# Patient Record
Sex: FEMALE | Race: OTHER | NOT HISPANIC OR LATINO
[De-identification: names, ages, dates, MRNs, and addresses within clinical notes are randomized per-mention and may not be internally consistent; named-entity substitution may affect disease eponyms.]

---

## 2017-12-13 PROBLEM — L82.1 OTHER SEBORRHEIC KERATOSIS: Status: ACTIVE | Noted: 2017-12-13

## 2017-12-13 PROBLEM — I83.93 ASYMPTOMATIC VARICOSE VEINS OF BILATERAL LOWER EXTREMITIES: Status: ACTIVE | Noted: 2017-12-13

## 2017-12-13 PROBLEM — D69.2 OTHER NONTHROMBOCYTOPENIC PURPURA: Status: ACTIVE | Noted: 2017-12-13

## 2017-12-13 PROBLEM — L81.4 OTHER MELANIN HYPERPIGMENTATION: Status: ACTIVE | Noted: 2017-12-13

## 2017-12-13 PROBLEM — D22.71 MELANOCYTIC NEVI OF RIGHT LOWER LIMB, INCLUDING HIP: Status: ACTIVE | Noted: 2017-12-13

## 2017-12-13 PROBLEM — L85.3 XEROSIS CUTIS: Status: ACTIVE | Noted: 2017-12-13

## 2017-12-13 PROBLEM — D22.5 MELANOCYTIC NEVI OF TRUNK: Status: ACTIVE | Noted: 2017-12-13

## 2017-12-13 PROBLEM — D18.01 HEMANGIOMA OF SKIN AND SUBCUTANEOUS TISSUE: Status: ACTIVE | Noted: 2017-12-13

## 2018-06-01 ENCOUNTER — RECORD ABSTRACTING (OUTPATIENT)
Age: 73
End: 2018-06-01

## 2018-06-01 DIAGNOSIS — Z82.49 FAMILY HISTORY OF ISCHEMIC HEART DISEASE AND OTHER DISEASES OF THE CIRCULATORY SYSTEM: ICD-10-CM

## 2018-06-01 DIAGNOSIS — M70.72 OTHER BURSITIS OF HIP, LEFT HIP: ICD-10-CM

## 2018-06-01 DIAGNOSIS — I10 ESSENTIAL (PRIMARY) HYPERTENSION: ICD-10-CM

## 2018-06-01 DIAGNOSIS — Z87.891 PERSONAL HISTORY OF NICOTINE DEPENDENCE: ICD-10-CM

## 2018-06-01 DIAGNOSIS — M81.0 AGE-RELATED OSTEOPOROSIS W/OUT CURRENT PATHOLOGICAL FRACTURE: ICD-10-CM

## 2018-06-01 RX ORDER — SIMVASTATIN 20 MG/1
20 TABLET, FILM COATED ORAL
Refills: 0 | Status: ACTIVE | COMMUNITY

## 2018-06-01 RX ORDER — IRBESARTAN 300 MG/1
300 TABLET, FILM COATED ORAL DAILY
Refills: 0 | Status: ACTIVE | COMMUNITY

## 2018-06-01 RX ORDER — IBANDRONATE SODIUM 150 MG/1
150 TABLET, FILM COATED ORAL
Refills: 0 | Status: ACTIVE | COMMUNITY

## 2018-06-01 RX ORDER — ERGOCALCIFEROL (VITAMIN D2) 1250 MCG
50000 CAPSULE ORAL
Refills: 0 | Status: ACTIVE | COMMUNITY

## 2018-06-01 RX ORDER — HYDROCHLOROTHIAZIDE 12.5 MG/1
12.5 TABLET ORAL
Refills: 0 | Status: ACTIVE | COMMUNITY

## 2018-08-20 ENCOUNTER — APPOINTMENT (OUTPATIENT)
Dept: ORTHOPEDIC SURGERY | Facility: CLINIC | Age: 73
End: 2018-08-20
Payer: MEDICARE

## 2018-08-20 VITALS — BODY MASS INDEX: 33.32 KG/M2 | HEIGHT: 65 IN | WEIGHT: 200 LBS

## 2018-08-20 DIAGNOSIS — M17.10 UNILATERAL PRIMARY OSTEOARTHRITIS, UNSPECIFIED KNEE: ICD-10-CM

## 2018-08-20 DIAGNOSIS — M70.61 TROCHANTERIC BURSITIS, RIGHT HIP: ICD-10-CM

## 2018-08-20 DIAGNOSIS — M43.07 SPONDYLOLYSIS, LUMBOSACRAL REGION: ICD-10-CM

## 2018-08-20 PROCEDURE — 73562 X-RAY EXAM OF KNEE 3: CPT | Mod: 50

## 2018-08-20 PROCEDURE — 99214 OFFICE O/P EST MOD 30 MIN: CPT

## 2018-12-17 PROBLEM — L82.1 OTHER SEBORRHEIC KERATOSIS: Status: ACTIVE | Noted: 2018-12-17

## 2018-12-17 PROBLEM — D69.2 OTHER NONTHROMBOCYTOPENIC PURPURA: Status: ACTIVE | Noted: 2018-12-17

## 2018-12-17 PROBLEM — L85.3 XEROSIS CUTIS: Status: ACTIVE | Noted: 2018-12-17

## 2018-12-17 PROBLEM — D22.71 MELANOCYTIC NEVI OF RIGHT LOWER LIMB, INCLUDING HIP: Status: ACTIVE | Noted: 2018-12-17

## 2018-12-17 PROBLEM — D22.5 MELANOCYTIC NEVI OF TRUNK: Status: ACTIVE | Noted: 2018-12-17

## 2018-12-17 PROBLEM — L72.8 OTHER FOLLICULAR CYSTS OF THE SKIN AND SUBCUTANEOUS TISSUE: Status: ACTIVE | Noted: 2018-12-17

## 2018-12-17 PROBLEM — D18.01 HEMANGIOMA OF SKIN AND SUBCUTANEOUS TISSUE: Status: ACTIVE | Noted: 2018-12-17

## 2018-12-17 PROBLEM — I83.93 ASYMPTOMATIC VARICOSE VEINS OF BILATERAL LOWER EXTREMITIES: Status: ACTIVE | Noted: 2018-12-17

## 2018-12-17 PROBLEM — L81.4 OTHER MELANIN HYPERPIGMENTATION: Status: ACTIVE | Noted: 2018-12-17

## 2019-04-29 PROBLEM — L81.4 OTHER MELANIN HYPERPIGMENTATION: Status: ACTIVE | Noted: 2019-04-29

## 2019-04-29 PROBLEM — D18.01 HEMANGIOMA OF SKIN AND SUBCUTANEOUS TISSUE: Status: ACTIVE | Noted: 2019-04-29

## 2019-04-29 PROBLEM — D69.2 OTHER NONTHROMBOCYTOPENIC PURPURA: Status: ACTIVE | Noted: 2019-04-29

## 2019-06-21 ENCOUNTER — INPATIENT (INPATIENT)
Facility: HOSPITAL | Age: 74
LOS: 3 days | Discharge: NURSING, REHAB OR LONG-TERM CARE | DRG: 552 | End: 2019-06-25
Attending: STUDENT IN AN ORGANIZED HEALTH CARE EDUCATION/TRAINING PROGRAM | Admitting: STUDENT IN AN ORGANIZED HEALTH CARE EDUCATION/TRAINING PROGRAM
Payer: MEDICARE

## 2019-06-21 VITALS
OXYGEN SATURATION: 98 % | HEART RATE: 67 BPM | SYSTOLIC BLOOD PRESSURE: 137 MMHG | TEMPERATURE: 98 F | WEIGHT: 199.08 LBS | DIASTOLIC BLOOD PRESSURE: 82 MMHG | RESPIRATION RATE: 20 BRPM | HEIGHT: 66 IN

## 2019-06-21 PROCEDURE — 99285 EMERGENCY DEPT VISIT HI MDM: CPT | Mod: GC

## 2019-06-22 DIAGNOSIS — M81.0 AGE-RELATED OSTEOPOROSIS WITHOUT CURRENT PATHOLOGICAL FRACTURE: ICD-10-CM

## 2019-06-22 DIAGNOSIS — M54.9 DORSALGIA, UNSPECIFIED: ICD-10-CM

## 2019-06-22 DIAGNOSIS — I10 ESSENTIAL (PRIMARY) HYPERTENSION: ICD-10-CM

## 2019-06-22 DIAGNOSIS — Z96.659 PRESENCE OF UNSPECIFIED ARTIFICIAL KNEE JOINT: Chronic | ICD-10-CM

## 2019-06-22 DIAGNOSIS — Z29.9 ENCOUNTER FOR PROPHYLACTIC MEASURES, UNSPECIFIED: ICD-10-CM

## 2019-06-22 DIAGNOSIS — E87.8 OTHER DISORDERS OF ELECTROLYTE AND FLUID BALANCE, NOT ELSEWHERE CLASSIFIED: ICD-10-CM

## 2019-06-22 DIAGNOSIS — Z96.651 PRESENCE OF RIGHT ARTIFICIAL KNEE JOINT: Chronic | ICD-10-CM

## 2019-06-22 LAB
ALBUMIN SERPL ELPH-MCNC: 4 G/DL — SIGNIFICANT CHANGE UP (ref 3.3–5)
ALP SERPL-CCNC: 34 U/L — LOW (ref 40–120)
ALT FLD-CCNC: 12 U/L — SIGNIFICANT CHANGE UP (ref 10–45)
ANION GAP SERPL CALC-SCNC: 12 MMOL/L — SIGNIFICANT CHANGE UP (ref 5–17)
ANION GAP SERPL CALC-SCNC: 9 MMOL/L — SIGNIFICANT CHANGE UP (ref 5–17)
APPEARANCE UR: ABNORMAL
AST SERPL-CCNC: 10 U/L — SIGNIFICANT CHANGE UP (ref 10–40)
BASOPHILS # BLD AUTO: 0 K/UL — SIGNIFICANT CHANGE UP (ref 0–0.2)
BASOPHILS NFR BLD AUTO: 0.4 % — SIGNIFICANT CHANGE UP (ref 0–2)
BILIRUB SERPL-MCNC: 0.4 MG/DL — SIGNIFICANT CHANGE UP (ref 0.2–1.2)
BILIRUB UR-MCNC: NEGATIVE — SIGNIFICANT CHANGE UP
BUN SERPL-MCNC: 18 MG/DL — SIGNIFICANT CHANGE UP (ref 7–23)
BUN SERPL-MCNC: 22 MG/DL — SIGNIFICANT CHANGE UP (ref 7–23)
CALCIUM SERPL-MCNC: 9.4 MG/DL — SIGNIFICANT CHANGE UP (ref 8.4–10.5)
CALCIUM SERPL-MCNC: 9.6 MG/DL — SIGNIFICANT CHANGE UP (ref 8.4–10.5)
CHLORIDE SERPL-SCNC: 104 MMOL/L — SIGNIFICANT CHANGE UP (ref 96–108)
CHLORIDE SERPL-SCNC: 105 MMOL/L — SIGNIFICANT CHANGE UP (ref 96–108)
CO2 SERPL-SCNC: 24 MMOL/L — SIGNIFICANT CHANGE UP (ref 22–31)
CO2 SERPL-SCNC: 26 MMOL/L — SIGNIFICANT CHANGE UP (ref 22–31)
COLOR SPEC: YELLOW — SIGNIFICANT CHANGE UP
CREAT SERPL-MCNC: 0.56 MG/DL — SIGNIFICANT CHANGE UP (ref 0.5–1.3)
CREAT SERPL-MCNC: 0.6 MG/DL — SIGNIFICANT CHANGE UP (ref 0.5–1.3)
DIFF PNL FLD: ABNORMAL
EOSINOPHIL # BLD AUTO: 0.3 K/UL — SIGNIFICANT CHANGE UP (ref 0–0.5)
EOSINOPHIL NFR BLD AUTO: 4 % — SIGNIFICANT CHANGE UP (ref 0–6)
GLUCOSE SERPL-MCNC: 104 MG/DL — HIGH (ref 70–99)
GLUCOSE SERPL-MCNC: 85 MG/DL — SIGNIFICANT CHANGE UP (ref 70–99)
GLUCOSE UR QL: NEGATIVE — SIGNIFICANT CHANGE UP
HCT VFR BLD CALC: 37.9 % — SIGNIFICANT CHANGE UP (ref 34.5–45)
HGB BLD-MCNC: 12.9 G/DL — SIGNIFICANT CHANGE UP (ref 11.5–15.5)
KETONES UR-MCNC: NEGATIVE — SIGNIFICANT CHANGE UP
LEUKOCYTE ESTERASE UR-ACNC: ABNORMAL
LYMPHOCYTES # BLD AUTO: 1.4 K/UL — SIGNIFICANT CHANGE UP (ref 1–3.3)
LYMPHOCYTES # BLD AUTO: 19.5 % — SIGNIFICANT CHANGE UP (ref 13–44)
MAGNESIUM SERPL-MCNC: 1.9 MG/DL — SIGNIFICANT CHANGE UP (ref 1.6–2.6)
MCHC RBC-ENTMCNC: 31.8 PG — SIGNIFICANT CHANGE UP (ref 27–34)
MCHC RBC-ENTMCNC: 34 GM/DL — SIGNIFICANT CHANGE UP (ref 32–36)
MCV RBC AUTO: 93.5 FL — SIGNIFICANT CHANGE UP (ref 80–100)
MONOCYTES # BLD AUTO: 0.7 K/UL — SIGNIFICANT CHANGE UP (ref 0–0.9)
MONOCYTES NFR BLD AUTO: 9.7 % — SIGNIFICANT CHANGE UP (ref 2–14)
NEUTROPHILS # BLD AUTO: 4.9 K/UL — SIGNIFICANT CHANGE UP (ref 1.8–7.4)
NEUTROPHILS NFR BLD AUTO: 66.3 % — SIGNIFICANT CHANGE UP (ref 43–77)
NITRITE UR-MCNC: POSITIVE
PH UR: 6.5 — SIGNIFICANT CHANGE UP (ref 5–8)
PHOSPHATE SERPL-MCNC: 1.6 MG/DL — LOW (ref 2.5–4.5)
PLATELET # BLD AUTO: 244 K/UL — SIGNIFICANT CHANGE UP (ref 150–400)
POTASSIUM SERPL-MCNC: 3 MMOL/L — LOW (ref 3.5–5.3)
POTASSIUM SERPL-MCNC: 3.5 MMOL/L — SIGNIFICANT CHANGE UP (ref 3.5–5.3)
POTASSIUM SERPL-SCNC: 3 MMOL/L — LOW (ref 3.5–5.3)
POTASSIUM SERPL-SCNC: 3.5 MMOL/L — SIGNIFICANT CHANGE UP (ref 3.5–5.3)
PROT SERPL-MCNC: 6.5 G/DL — SIGNIFICANT CHANGE UP (ref 6–8.3)
PROT UR-MCNC: 100 — SIGNIFICANT CHANGE UP
RBC # BLD: 4.05 M/UL — SIGNIFICANT CHANGE UP (ref 3.8–5.2)
RBC # FLD: 12.1 % — SIGNIFICANT CHANGE UP (ref 10.3–14.5)
SODIUM SERPL-SCNC: 140 MMOL/L — SIGNIFICANT CHANGE UP (ref 135–145)
SODIUM SERPL-SCNC: 140 MMOL/L — SIGNIFICANT CHANGE UP (ref 135–145)
SP GR SPEC: 1.02 — SIGNIFICANT CHANGE UP (ref 1.01–1.02)
UROBILINOGEN FLD QL: NEGATIVE — SIGNIFICANT CHANGE UP
WBC # BLD: 7.4 K/UL — SIGNIFICANT CHANGE UP (ref 3.8–10.5)
WBC # FLD AUTO: 7.4 K/UL — SIGNIFICANT CHANGE UP (ref 3.8–10.5)

## 2019-06-22 PROCEDURE — 72158 MRI LUMBAR SPINE W/O & W/DYE: CPT | Mod: 26

## 2019-06-22 PROCEDURE — 72157 MRI CHEST SPINE W/O & W/DYE: CPT | Mod: 26

## 2019-06-22 PROCEDURE — 99223 1ST HOSP IP/OBS HIGH 75: CPT | Mod: AI,GC

## 2019-06-22 RX ORDER — ACETAMINOPHEN 500 MG
975 TABLET ORAL EVERY 6 HOURS
Refills: 0 | Status: DISCONTINUED | OUTPATIENT
Start: 2019-06-22 | End: 2019-06-25

## 2019-06-22 RX ORDER — PANTOPRAZOLE SODIUM 20 MG/1
40 TABLET, DELAYED RELEASE ORAL
Refills: 0 | Status: DISCONTINUED | OUTPATIENT
Start: 2019-06-22 | End: 2019-06-25

## 2019-06-22 RX ORDER — AMLODIPINE BESYLATE 2.5 MG/1
1 TABLET ORAL
Qty: 0 | Refills: 0 | DISCHARGE

## 2019-06-22 RX ORDER — ACETAMINOPHEN 500 MG
1000 TABLET ORAL ONCE
Refills: 0 | Status: COMPLETED | OUTPATIENT
Start: 2019-06-22 | End: 2019-06-22

## 2019-06-22 RX ORDER — TRAZODONE HCL 50 MG
1 TABLET ORAL
Qty: 0 | Refills: 0 | DISCHARGE

## 2019-06-22 RX ORDER — OXYCODONE HYDROCHLORIDE 5 MG/1
5 TABLET ORAL EVERY 4 HOURS
Refills: 0 | Status: DISCONTINUED | OUTPATIENT
Start: 2019-06-22 | End: 2019-06-25

## 2019-06-22 RX ORDER — SIMVASTATIN 20 MG/1
20 TABLET, FILM COATED ORAL AT BEDTIME
Refills: 0 | Status: DISCONTINUED | OUTPATIENT
Start: 2019-06-22 | End: 2019-06-25

## 2019-06-22 RX ORDER — OXYCODONE HYDROCHLORIDE 5 MG/1
5 TABLET ORAL ONCE
Refills: 0 | Status: DISCONTINUED | OUTPATIENT
Start: 2019-06-22 | End: 2019-06-22

## 2019-06-22 RX ORDER — ASPIRIN/CALCIUM CARB/MAGNESIUM 324 MG
1 TABLET ORAL
Qty: 0 | Refills: 0 | DISCHARGE

## 2019-06-22 RX ORDER — LIDOCAINE 4 G/100G
1 CREAM TOPICAL DAILY
Refills: 0 | Status: DISCONTINUED | OUTPATIENT
Start: 2019-06-22 | End: 2019-06-25

## 2019-06-22 RX ORDER — POTASSIUM CHLORIDE 20 MEQ
40 PACKET (EA) ORAL ONCE
Refills: 0 | Status: COMPLETED | OUTPATIENT
Start: 2019-06-22 | End: 2019-06-22

## 2019-06-22 RX ORDER — OXYBUTYNIN CHLORIDE 5 MG
5 TABLET ORAL THREE TIMES A DAY
Refills: 0 | Status: DISCONTINUED | OUTPATIENT
Start: 2019-06-22 | End: 2019-06-23

## 2019-06-22 RX ORDER — CYCLOBENZAPRINE HYDROCHLORIDE 10 MG/1
5 TABLET, FILM COATED ORAL ONCE
Refills: 0 | Status: COMPLETED | OUTPATIENT
Start: 2019-06-22 | End: 2019-06-22

## 2019-06-22 RX ORDER — CYCLOBENZAPRINE HYDROCHLORIDE 10 MG/1
10 TABLET, FILM COATED ORAL THREE TIMES A DAY
Refills: 0 | Status: DISCONTINUED | OUTPATIENT
Start: 2019-06-22 | End: 2019-06-25

## 2019-06-22 RX ORDER — CEFTRIAXONE 500 MG/1
1000 INJECTION, POWDER, FOR SOLUTION INTRAMUSCULAR; INTRAVENOUS EVERY 24 HOURS
Refills: 0 | Status: DISCONTINUED | OUTPATIENT
Start: 2019-06-22 | End: 2019-06-23

## 2019-06-22 RX ORDER — DIAZEPAM 5 MG
5 TABLET ORAL ONCE
Refills: 0 | Status: DISCONTINUED | OUTPATIENT
Start: 2019-06-22 | End: 2019-06-22

## 2019-06-22 RX ORDER — LOSARTAN POTASSIUM 100 MG/1
100 TABLET, FILM COATED ORAL DAILY
Refills: 0 | Status: DISCONTINUED | OUTPATIENT
Start: 2019-06-22 | End: 2019-06-25

## 2019-06-22 RX ORDER — SIMVASTATIN 20 MG/1
1 TABLET, FILM COATED ORAL
Qty: 0 | Refills: 0 | DISCHARGE

## 2019-06-22 RX ORDER — OXYBUTYNIN CHLORIDE 5 MG
1 TABLET ORAL
Qty: 0 | Refills: 0 | DISCHARGE

## 2019-06-22 RX ORDER — OMEPRAZOLE 10 MG/1
1 CAPSULE, DELAYED RELEASE ORAL
Qty: 0 | Refills: 0 | DISCHARGE

## 2019-06-22 RX ORDER — ACETAMINOPHEN 500 MG
975 TABLET ORAL ONCE
Refills: 0 | Status: COMPLETED | OUTPATIENT
Start: 2019-06-22 | End: 2019-06-22

## 2019-06-22 RX ORDER — AMLODIPINE BESYLATE 2.5 MG/1
5 TABLET ORAL DAILY
Refills: 0 | Status: DISCONTINUED | OUTPATIENT
Start: 2019-06-22 | End: 2019-06-25

## 2019-06-22 RX ORDER — ENOXAPARIN SODIUM 100 MG/ML
40 INJECTION SUBCUTANEOUS EVERY 24 HOURS
Refills: 0 | Status: DISCONTINUED | OUTPATIENT
Start: 2019-06-22 | End: 2019-06-25

## 2019-06-22 RX ORDER — TRAMADOL HYDROCHLORIDE 50 MG/1
50 TABLET ORAL THREE TIMES A DAY
Refills: 0 | Status: DISCONTINUED | OUTPATIENT
Start: 2019-06-22 | End: 2019-06-25

## 2019-06-22 RX ADMIN — Medication 40 MILLIEQUIVALENT(S): at 09:40

## 2019-06-22 RX ADMIN — Medication 1000 MILLIGRAM(S): at 10:25

## 2019-06-22 RX ADMIN — Medication 975 MILLIGRAM(S): at 03:08

## 2019-06-22 RX ADMIN — Medication 975 MILLIGRAM(S): at 14:21

## 2019-06-22 RX ADMIN — CYCLOBENZAPRINE HYDROCHLORIDE 10 MILLIGRAM(S): 10 TABLET, FILM COATED ORAL at 14:21

## 2019-06-22 RX ADMIN — Medication 975 MILLIGRAM(S): at 15:15

## 2019-06-22 RX ADMIN — OXYCODONE HYDROCHLORIDE 5 MILLIGRAM(S): 5 TABLET ORAL at 03:08

## 2019-06-22 RX ADMIN — ENOXAPARIN SODIUM 40 MILLIGRAM(S): 100 INJECTION SUBCUTANEOUS at 18:43

## 2019-06-22 RX ADMIN — LIDOCAINE 1 PATCH: 4 CREAM TOPICAL at 18:43

## 2019-06-22 RX ADMIN — OXYCODONE HYDROCHLORIDE 5 MILLIGRAM(S): 5 TABLET ORAL at 04:04

## 2019-06-22 RX ADMIN — OXYCODONE HYDROCHLORIDE 5 MILLIGRAM(S): 5 TABLET ORAL at 22:31

## 2019-06-22 RX ADMIN — AMLODIPINE BESYLATE 5 MILLIGRAM(S): 2.5 TABLET ORAL at 18:43

## 2019-06-22 RX ADMIN — Medication 975 MILLIGRAM(S): at 04:04

## 2019-06-22 RX ADMIN — SIMVASTATIN 20 MILLIGRAM(S): 20 TABLET, FILM COATED ORAL at 22:31

## 2019-06-22 RX ADMIN — Medication 5 MILLIGRAM(S): at 05:10

## 2019-06-22 RX ADMIN — Medication 400 MILLIGRAM(S): at 09:41

## 2019-06-22 RX ADMIN — CEFTRIAXONE 100 MILLIGRAM(S): 500 INJECTION, POWDER, FOR SOLUTION INTRAMUSCULAR; INTRAVENOUS at 23:20

## 2019-06-22 RX ADMIN — CYCLOBENZAPRINE HYDROCHLORIDE 5 MILLIGRAM(S): 10 TABLET, FILM COATED ORAL at 03:08

## 2019-06-22 RX ADMIN — Medication 5 MILLIGRAM(S): at 22:31

## 2019-06-22 RX ADMIN — CYCLOBENZAPRINE HYDROCHLORIDE 10 MILLIGRAM(S): 10 TABLET, FILM COATED ORAL at 20:50

## 2019-06-22 NOTE — ED PROVIDER NOTE - ATTENDING CONTRIBUTION TO CARE
73 yof pmhx none signif presents w lower back pain w rads into buttock/ left leg and intermittently right. states has chronic back pain, however significantly worse after a prolonged drive from [place] few days ago w sitting in car x hours. staes no recent trauma or falls, no f/c. no recent invasive procedures. pain severe. states cannot walk due to pain    ROS:   constitutional - no fever, no chills  eyes - no visual changes, no redness  eent - no sore throat, no nasal congestion  cvs - no chest pain, no leg swelling  resp - no shortness of breath, no cough  gi - no abdominal pain, no vomiting, no diarrhea  gu - no dysuria, no hematuria  msk - + acute back pain, no joint swelling  skin - no rashes, no jaundice  neuro - no headache, no focal weakness  psych - no acute mental health issue     Physical Exam:   constitutional - well appearing, awake and alert, oriented x3, mild distress due to pain   head - no external evidence of trauma  eent - no conjunctival injection or icterus, mucous membranes moist   cvs - rrr, no murmurs, no peripheral edema  resp - breath sounds clear and equal bilat, normal respiratory effort  gi - abdomen soft and nontender, no rigidity, guarding or rebound, bowel sounds present  msk - moving all extremities spontaneously, gait not tested due to pain. midline lumbar tenderness and left buttock tenderness. no rashes. SLR + at 30 deg. no saddle anesthesia. strength 5/5 in all ext.   neuro - alert and oriented x3, no focal deficits, CNs 2-12 grossly intact  skin- no jaundice, warm and dry  psych - mood and affect wnl, no apparent risk to self or others     pt urinated on self in ED as states too much pain to walk to the bathroom, but denies incontinence. most likely arthritis vs herniated disk;  in ED yelling, demanding pt be admitted. pt unable to walk due to pain. may require mri, though no acute indications for acute spinal cord pathology at this time. CHICHO Tello MD

## 2019-06-22 NOTE — ED PROVIDER NOTE - CLINICAL SUMMARY MEDICAL DECISION MAKING FREE TEXT BOX
patient with back pain radiating down left leg. Likely sciatica and msk pain. Will treat symptomatically

## 2019-06-22 NOTE — H&P ADULT - HISTORY OF PRESENT ILLNESS
72 yo F with history of HTN, PE, b/l knee replacement, OA, osteoporosis presenting with back pain. Pt has been driving up from [place] over the past 4 days. Yesterday, while in [place], when she went to get up from her bed, she was unable to put any weight on her L leg and fell on the ground. She fell into a sitting position, had no head trauma but was unable to get up and EMS was called. She decided against going to the ED and EMS helped her into her car where her  drove her up to [place] to come to the hospital here. She was sitting in her car for 8 hours and on presentation to our ER, she began to have 10/10 pain on movement of her L leg. She endorses that the pain starts in her lower back and shoots down her leg with any movement. She is also experiencing muscle spasms in her lower back that cause pain as well. She is also endorsing numbness to the L lateral side of her LLE as well as the back of her LLE all the way down to her foot. She denies numbness to her RLE but endorses some mild pain in that side as well. Pt has a h/o osteoporosis and was told that she has a slipped disc but 72 yo F with history of HTN, PE, b/l knee replacement, OA, osteoporosis presenting with back pain. Pt has been driving up from [place] over the past 4 days. Yesterday, while in [place], when she went to get up from her bed, she was unable to put any weight on her L leg and fell on the ground. She fell into a sitting position, had no head trauma but was unable to get up and EMS was called. She decided against going to the ED and EMS helped her into her car where her  drove her up to [place] to come to the hospital here. She was sitting in her car for 8 hours and on presentation to our ER, she began to have 10/10 pain on movement of her L leg. She endorses that the pain starts in her lower back and shoots down her leg with any movement. She is also experiencing muscle spasms in her lower back that cause pain as well. She is also endorsing numbness to the L lateral side of her LLE as well as the back of her LLE all the way down to her foot. She denies numbness to her RLE but endorses some mild pain in that side as well. Pt has a h/o osteoporosis and was told that she has a slipped disc but has never had symptoms such as this before. Pt also endorsing that the day before yesterday, she had diarrhea all day, about 10 BMs that have now since completely resolved. Denies any incontinence, saddle anesthesia.    ED:  VS - Afebrile, HR 67, /64, RR 18, SpO2 100% on RA  Given acetaminophen 1g x2, flexeril 5mg, valium 5mg, oxycodone 5mg, potassium chloride 40mEq

## 2019-06-22 NOTE — H&P ADULT - NSHPREVIEWOFSYSTEMS_GEN_ALL_CORE
General: Denies dizziness, fatigue  Eyes: Denies blurry vision  ENMT: Denies rhinorrhea  Respiratory: Denies cough, SOB  Cardiovascular: Denies palpitations, CP  Gastrointestinal: Denies abd pain, N/V/D/C, hematochezia, melena  : Denies dysuria, increased freq  Musculoskeletal: Denies edema, joint pain  Endocrine: Denies increased thirst, increased frequency  Allergic/Immunologic: Denies rashes or hives  Neuro: +weakness, numbness

## 2019-06-22 NOTE — H&P ADULT - PROBLEM SELECTOR PLAN 1
Pt with new onset back pain in setting of muscle spasms, osteoporosis and potential slipped disc.  - C/w antispasmodics flexeril  - Tylenol, tramadol and oxycodone for pain  - Lovenox for DVT ppx  - PT eval  - MR lumbar and thoracic spine to r/o cord compression

## 2019-06-22 NOTE — ED ADULT NURSE REASSESSMENT NOTE - NS ED NURSE REASSESS COMMENT FT1
Pt complaining of pain, NP notified and to order pain medication, as per NP it's ok for Pt to eat and RN brought tray to Pt. Pt repositioned for comfort and brought pillow.

## 2019-06-22 NOTE — CHART NOTE - NSCHARTNOTEFT_GEN_A_CORE
d/w w/ radiology, pre-guerrero read of MRI, no signs of cord compression    Silvia Copeland  MAR  [id number]

## 2019-06-22 NOTE — ED PROVIDER NOTE - MUSCULOSKELETAL MINIMAL EXAM
left paraspinal lumbar tenderness, postive left straight leg raise, no midline tenderness, no bony tenderness, no ecchymosis

## 2019-06-22 NOTE — ED ADULT NURSE NOTE - OBJECTIVE STATEMENT
pt is here with low back pain and bilateral leg pain with numbness on bilateral thighs, left more than right; pt states she is unable to walk; request lift from  to stretcher upon arrival from waiting room; pt states she and her  recently drove up from [place] 2 days ago; pt denies any traum, falls, or mvc; pt is fully alert and oriented x 4; pt's  is at bedside; pt reports diarrhea yesterday x 1.

## 2019-06-22 NOTE — H&P ADULT - ASSESSMENT
72 yo F with history of HTN, PE, b/l knee replacement, OA, osteoporosis presenting with back pain with radiation down her leg and associated numbness.

## 2019-06-22 NOTE — H&P ADULT - NSHPLABSRESULTS_GEN_ALL_CORE
CBC Full  -  ( 22 Jun 2019 04:52 )  WBC Count : 7.4 K/uL  RBC Count : 4.05 M/uL  Hemoglobin : 12.9 g/dL  Hematocrit : 37.9 %  Platelet Count - Automated : 244 K/uL  Mean Cell Volume : 93.5 fl  Mean Cell Hemoglobin : 31.8 pg  Mean Cell Hemoglobin Concentration : 34.0 gm/dL  Auto Neutrophil # : 4.9 K/uL  Auto Lymphocyte # : 1.4 K/uL  Auto Monocyte # : 0.7 K/uL  Auto Eosinophil # : 0.3 K/uL  Auto Basophil # : 0.0 K/uL  Auto Neutrophil % : 66.3 %  Auto Lymphocyte % : 19.5 %  Auto Monocyte % : 9.7 %  Auto Eosinophil % : 4.0 %  Auto Basophil % : 0.4 %    06-22    140  |  105  |  18  ----------------------------<  85  3.5   |  26  |  0.56    Ca    9.4      22 Jun 2019 13:19  Phos  1.6     06-22  Mg     1.9     06-22    TPro  6.5  /  Alb  4.0  /  TBili  0.4  /  DBili  x   /  AST  10  /  ALT  12  /  AlkPhos  34<L>  06-22    EKG: Normal sinus rhythm, no evidence of ischemia

## 2019-06-22 NOTE — H&P ADULT - NSHPPHYSICALEXAM_GEN_ALL_CORE
Vital Signs Last 24 Hrs  T(C): 36.5 (22 Jun 2019 09:53), Max: 36.8 (22 Jun 2019 03:20)  T(F): 97.7 (22 Jun 2019 09:53), Max: 98.3 (22 Jun 2019 03:20)  HR: 66 (22 Jun 2019 09:53) (66 - 67)  BP: 139/78 (22 Jun 2019 09:53) (121/64 - 139/78)  BP(mean): --  RR: 18 (22 Jun 2019 09:53) (18 - 20)  SpO2: 100% (22 Jun 2019 09:53) (98% - 100%)    Constitutional: Elderly woman seen lying in bed in NAD  Eyes: PERRL, sclera clear  ENMT: MMM, clear oropharynx  Neck: Supple, no cervical LAD  Respiratory: CTAB, no rales, rhonchi or wheezes  Cardiovascular: RRR, no m/g/r  Gastrointestinal: +BS, soft, NT/ND  Neurological: AAOx3  Psychiatric: Appropriate affect and mood  Extremities: No LE edema  MSK: Point tenderness over lumbar spinal processes, ttp over L paraspinal muscles of lumbar region; mild numbness of lateral and posterior LLE; 1/5 strength of LLE, 5/5 strength of RLE; negative leg raise test

## 2019-06-23 DIAGNOSIS — I26.99 OTHER PULMONARY EMBOLISM WITHOUT ACUTE COR PULMONALE: ICD-10-CM

## 2019-06-23 PROCEDURE — 99221 1ST HOSP IP/OBS SF/LOW 40: CPT

## 2019-06-23 PROCEDURE — 99233 SBSQ HOSP IP/OBS HIGH 50: CPT | Mod: GC

## 2019-06-23 RX ADMIN — CYCLOBENZAPRINE HYDROCHLORIDE 10 MILLIGRAM(S): 10 TABLET, FILM COATED ORAL at 13:00

## 2019-06-23 RX ADMIN — OXYCODONE HYDROCHLORIDE 5 MILLIGRAM(S): 5 TABLET ORAL at 19:42

## 2019-06-23 RX ADMIN — OXYCODONE HYDROCHLORIDE 5 MILLIGRAM(S): 5 TABLET ORAL at 05:54

## 2019-06-23 RX ADMIN — Medication 975 MILLIGRAM(S): at 13:29

## 2019-06-23 RX ADMIN — LIDOCAINE 1 PATCH: 4 CREAM TOPICAL at 13:04

## 2019-06-23 RX ADMIN — Medication 975 MILLIGRAM(S): at 12:59

## 2019-06-23 RX ADMIN — CYCLOBENZAPRINE HYDROCHLORIDE 10 MILLIGRAM(S): 10 TABLET, FILM COATED ORAL at 07:01

## 2019-06-23 RX ADMIN — AMLODIPINE BESYLATE 5 MILLIGRAM(S): 2.5 TABLET ORAL at 05:54

## 2019-06-23 RX ADMIN — Medication 24 MILLIGRAM(S): at 18:43

## 2019-06-23 RX ADMIN — ENOXAPARIN SODIUM 40 MILLIGRAM(S): 100 INJECTION SUBCUTANEOUS at 18:45

## 2019-06-23 RX ADMIN — LIDOCAINE 1 PATCH: 4 CREAM TOPICAL at 19:36

## 2019-06-23 RX ADMIN — Medication 5 MILLIGRAM(S): at 05:55

## 2019-06-23 RX ADMIN — LOSARTAN POTASSIUM 100 MILLIGRAM(S): 100 TABLET, FILM COATED ORAL at 05:54

## 2019-06-23 RX ADMIN — PANTOPRAZOLE SODIUM 40 MILLIGRAM(S): 20 TABLET, DELAYED RELEASE ORAL at 07:01

## 2019-06-23 RX ADMIN — OXYCODONE HYDROCHLORIDE 5 MILLIGRAM(S): 5 TABLET ORAL at 20:15

## 2019-06-23 RX ADMIN — SIMVASTATIN 20 MILLIGRAM(S): 20 TABLET, FILM COATED ORAL at 22:19

## 2019-06-23 NOTE — PROGRESS NOTE ADULT - PROBLEM SELECTOR PLAN 1
Pt with new onset back pain in setting of muscle spasms, osteoporosis and potential slipped disc.  - C/w antispasmodics flexeril  - Tylenol, tramadol and oxycodone for pain  - Lovenox for DVT ppx  - PT eval  - MR lumbar and thoracic spine shows multilevel degenerative changes from L1-S1, NSX consulted-> no surgical intervention at this time  - Will start medrol dose howie Pt with new onset back pain in setting of muscle spasms, osteoporosis and potential slipped disc.  - C/w antispasmodics flexeril  - Tylenol, tramadol, lidocaine, and oxycodone for pain  - Lovenox for DVT ppx  - PT eval  - MR lumbar and thoracic spine shows multilevel degenerative changes from L1-S1, NSX consulted-> no surgical intervention at this time  - Will start medrol dose howie

## 2019-06-23 NOTE — CONSULT NOTE ADULT - ASSESSMENT
73 yr old F h/o HTN, PE, b/l knee replacement, OA, osteoporosis admitted for back pain. She endorses that the pain starts in her lower back and shoots down her leg with any movement. She is also endorsing numbness to the L lateral side of her LLE. MRI showed grade 1 anterolisthesis seen involving L5 on S1 and multilevel degenerative changes from L1-S1.     Plan:  - No acute neurosurgical intervention at this time  - Continue management per primary team   - Pain control  - Medrol dose pack  - Physical therapy   - May follow up with Dr. Beasley upon discharge 73 yr old F h/o HTN, PE, b/l knee replacement, OA, osteoporosis on ASA admitted for back pain. She endorses that the pain starts in her lower back and shoots down her leg with any movement. She is also endorsing numbness to the L lateral side of her LLE. MRI showed grade 1 anterolisthesis seen involving L5 on S1 and multilevel degenerative changes from L1-S1.     Plan:  - No acute neurosurgical intervention at this time  - Continue management per primary team   - Hold ASA  - Pain control, gabapentin  - Medrol dose pack  - Physical therapy   - Will continue to follow-up, if no improvement may entertain surgical intervention

## 2019-06-23 NOTE — PROGRESS NOTE ADULT - PROBLEM SELECTOR PLAN 3
- C/w home meds amlodipine 5mg daily  - On losartan 100mg BID. Will give daily for now - C/w home meds amlodipine 5mg daily  - On losartan 100mg BID. Will give daily for now  - Monitor BP

## 2019-06-23 NOTE — CONSULT NOTE ADULT - ATTENDING COMMENTS
I reviewed the resident's note and agree. The patient is a 73-year-old female who presents with low back pain associated with left lower extremity pain and numbness, resulting in difficulty walking, found to have L3-S1 spinal stenosis and an L5-S1 Grade 1 spondylolisthesis. No acute neurosurgical intervention is indicated at this time. Consider a trial of conservative management with steroids, pain control, and physical therapy for now. I saw and evaluated the patient. I reviewed the resident's note and agree. The patient is a 73-year-old female who presents with low back pain associated with left greater than right lower extremity pain and numbness, resulting in difficulty walking, found to have L3-S1 spinal stenosis and an L5-S1 Grade 1 spondylolisthesis. No acute neurosurgical intervention is indicated at this time. Consider a trial of conservative management with steroids, pain control, and physical therapy for now.

## 2019-06-23 NOTE — PROGRESS NOTE ADULT - PROBLEM SELECTOR PLAN 4
H/o osteoporosis on prolia per patient.  - Monitor H/o osteoporosis on prolia per patient.  - outpt f/u H/o osteoporosis on prolia per patient.  - outpt f/u  -Will check Vitamin D level in am.

## 2019-06-23 NOTE — PROGRESS NOTE ADULT - PROBLEM SELECTOR PLAN 5
DVT ppx: lovenox subq  Diet: DASH - PT with hx of PE ~ 15 years ago, states it was incidentally found on CT scan. Endorses completing a 6 month course of coumadin  - Pt HDS, satting well on RA  - Outpt F/u  - DVT PPX with LMWH

## 2019-06-23 NOTE — PROGRESS NOTE ADULT - SUBJECTIVE AND OBJECTIVE BOX
Patient is a 73y old  Female who presents with a chief complaint of Back pain (2019 13:42)      SUBJECTIVE / OVERNIGHT EVENTS:  No acute events overnight. Patient seen and examined in AM. Patient denied fevers, CP, SOB, lower extremity pain.     MEDICATIONS  (STANDING):  amLODIPine   Tablet 5 milliGRAM(s) Oral daily  enoxaparin Injectable 40 milliGRAM(s) SubCutaneous every 24 hours  lidocaine   Patch 1 Patch Transdermal daily  losartan 100 milliGRAM(s) Oral daily  pantoprazole    Tablet 40 milliGRAM(s) Oral before breakfast  simvastatin 20 milliGRAM(s) Oral at bedtime    MEDICATIONS  (PRN):  acetaminophen   Tablet .. 975 milliGRAM(s) Oral every 6 hours PRN Mild Pain (1 - 3), Moderate Pain (4 - 6), Severe Pain (7 - 10)  cyclobenzaprine 10 milliGRAM(s) Oral three times a day PRN Muscle Spasm  oxyCODONE    IR 5 milliGRAM(s) Oral every 4 hours PRN Severe Pain (7 - 10)  traMADol 50 milliGRAM(s) Oral three times a day PRN Moderate Pain (4 - 6)        CAPILLARY BLOOD GLUCOSE        I&O's Summary    2019 07:01  -  2019 15:59  --------------------------------------------------------  IN: 360 mL / OUT: 400 mL / NET: -40 mL        PHYSICAL EXAM:  GENERAL: NAD, Obese  EYES: EOMI, PERRLA, conjunctiva and sclera clear  NECK: Supple, No JVD  CHEST/LUNG: Clear to auscultation bilaterally; No wheezes  HEART: Regular rate and rhythm; No murmurs, rubs, or gallops  ABDOMEN: Soft, Nontender, Nondistended; Bowel sounds present  EXTREMITIES:  No edema  PSYCH: AAOx3  NEUROLOGY: 3/5 in LLE ( limited by pain), 4/5 in RLE, 5/5 in upper ext's.   SKIN: No rashes or lesions    LABS:                        12.9   7.4   )-----------( 244      ( 2019 04:52 )             37.9     06-    140  |  105  |  18  ----------------------------<  85  3.5   |  26  |  0.56    Ca    9.4      2019 13:19  Phos  1.6       Mg     1.9         TPro  6.5  /  Alb  4.0  /  TBili  0.4  /  DBili  x   /  AST  10  /  ALT  12  /  AlkPhos  34<L>            Urinalysis Basic - ( 2019 14:43 )    Color: Yellow / Appearance: Slightly Turbid / S.016 / pH: x  Gluc: x / Ketone: Negative  / Bili: Negative / Urobili: Negative   Blood: x / Protein: 100 / Nitrite: Positive   Leuk Esterase: Large / RBC: 12 /hpf /  /HPF   Sq Epi: x / Non Sq Epi: 1 /hpf / Bacteria: Many            RADIOLOGY & ADDITIONAL TESTS:      Consultant(s) Notes Reviewed:  NSX    Care Discussed with Consultants/Other Providers: NSX Patient is a 73y old  Female who presents with a chief complaint of Back pain (2019 13:42)      SUBJECTIVE / OVERNIGHT EVENTS:  No acute events overnight. Patient seen and examined in AM. Patient denied fevers, CP, SOB, lower extremity pain.     MEDICATIONS  (STANDING):  amLODIPine   Tablet 5 milliGRAM(s) Oral daily  enoxaparin Injectable 40 milliGRAM(s) SubCutaneous every 24 hours  lidocaine   Patch 1 Patch Transdermal daily  losartan 100 milliGRAM(s) Oral daily  pantoprazole    Tablet 40 milliGRAM(s) Oral before breakfast  simvastatin 20 milliGRAM(s) Oral at bedtime    MEDICATIONS  (PRN):  acetaminophen   Tablet .. 975 milliGRAM(s) Oral every 6 hours PRN Mild Pain (1 - 3), Moderate Pain (4 - 6), Severe Pain (7 - 10)  cyclobenzaprine 10 milliGRAM(s) Oral three times a day PRN Muscle Spasm  oxyCODONE    IR 5 milliGRAM(s) Oral every 4 hours PRN Severe Pain (7 - 10)  traMADol 50 milliGRAM(s) Oral three times a day PRN Moderate Pain (4 - 6)        CAPILLARY BLOOD GLUCOSE        I&O's Summary    2019 07:01  -  2019 15:59  --------------------------------------------------------  IN: 360 mL / OUT: 400 mL / NET: -40 mL        PHYSICAL EXAM:  GENERAL: NAD, Obese  EYES: EOMI, PERRLA, conjunctiva and sclera clear  NECK: Supple, No JVD  CHEST/LUNG: Clear to auscultation bilaterally; No wheezes  HEART: Regular rate and rhythm; No murmurs, rubs, or gallops  ABDOMEN: Soft, Nontender, Nondistended; Bowel sounds present  EXTREMITIES:  No edema  PSYCH: AAOx3  NEUROLOGY: 3/5 in LLE ( limited by pain), 4/5 in RLE, 5/5 in upper ext's.   SKIN: No rashes or lesions    LABS:                        12.9   7.4   )-----------( 244      ( 2019 04:52 )             37.9     06-    140  |  105  |  18  ----------------------------<  85  3.5   |  26  |  0.56    Ca    9.4      2019 13:19  Phos  1.6       Mg     1.9         TPro  6.5  /  Alb  4.0  /  TBili  0.4  /  DBili  x   /  AST  10  /  ALT  12  /  AlkPhos  34<L>            Urinalysis Basic - ( 2019 14:43 )    Color: Yellow / Appearance: Slightly Turbid / S.016 / pH: x  Gluc: x / Ketone: Negative  / Bili: Negative / Urobili: Negative   Blood: x / Protein: 100 / Nitrite: Positive   Leuk Esterase: Large / RBC: 12 /hpf /  /HPF   Sq Epi: x / Non Sq Epi: 1 /hpf / Bacteria: Many      < from: MR Lumbar Spine w/wo IV Cont (19 @ 19:43) >  No abnormal masses or collections seen.    Impression: Degenerative changes as described above.    < end of copied text >        RADIOLOGY & ADDITIONAL TESTS:      Consultant(s) Notes Reviewed:  NSX    Care Discussed with Consultants/Other Providers: NSX

## 2019-06-23 NOTE — CONSULT NOTE ADULT - SUBJECTIVE AND OBJECTIVE BOX
p ([id number])     HPI: 73 yr old F h/o HTN, PE, b/l knee replacement, OA, osteoporosis admitted for back pain. Pt has been driving up from [place] over the past 4 days. Yesterday, while in [place], when she went to get up from her bed, she was unable to put any weight on her L leg and fell on the ground. She fell into a sitting position, had no head trauma but was unable to get up and EMS was called. She decided against going to the ED and EMS helped her into her car where her  drove her up to [place] to come to the hospital here. She endorses that the pain starts in her lower back and shoots down her leg with any movement. She is also endorsing numbness to the L lateral side of her LLE. Denies any incontinence, saddle anesthesia.    ED:  VS - Afebrile, HR 67, /64, RR 18, SpO2 100% on RA  Given acetaminophen 1g x2, flexeril 5mg, valium 5mg, oxycodone 5mg, potassium chloride 40mEq (2019 12:39)    PAST MEDICAL HISTORY   Osteoporosis  Osteoarthritis  Hypertension  No pertinent past medical history    PAST SURGICAL HISTORY   H/O total knee replacement  H/O total knee replacement, right    Aspir 81 (Hives; Other)  penicillin (Unknown)      MEDICATIONS:  Antibiotics:    Neuro:  acetaminophen   Tablet .. 975 milliGRAM(s) Oral every 6 hours PRN  cyclobenzaprine 10 milliGRAM(s) Oral three times a day PRN  oxyCODONE    IR 5 milliGRAM(s) Oral every 4 hours PRN  traMADol 50 milliGRAM(s) Oral three times a day PRN    Anticoagulation:  enoxaparin Injectable 40 milliGRAM(s) SubCutaneous every 24 hours    Other:  amLODIPine   Tablet 5 milliGRAM(s) Oral daily  losartan 100 milliGRAM(s) Oral daily  pantoprazole    Tablet 40 milliGRAM(s) Oral before breakfast  simvastatin 20 milliGRAM(s) Oral at bedtime      SOCIAL HISTORY:   Occupation:   Marital Status:     FAMILY HISTORY:  FH: CAD (coronary artery disease)      PHYSICAL EXAMINATION:   T(C): 37.2 (19 @ 04:45), Max: 37.2 (19 @ 04:45)  HR: 82 (19 @ 04:45) (76 - 94)  BP: 133/85 (19 @ 04:45) (115/74 - 133/85)  RR: 18 (19 @ 04:45) (18 - 20)  SpO2: 95% (19 @ 04:45) (95% - 99%)  Wt(kg): --    General Examination:     Neurologic Examination:             Constitutional: No Acute Distress     Neurological: AOx3, Following Commands    Motor exam:          Upper extremity                         Delt     Bicep     Tricep    HG                                                 R         5/5        5/5        5/5       5/5                                               L          5/5        5/5        5/5       5/5          Lower extremity                        HF         KF        KE       DF         PF                                 R (pain limited)     4/5        4/5       4/5       4/5        4/5                              L (pain limited)      4/5       4/5       4/5       4/5         4/5                                                 Sensation: [] intact to light touch  [X] decreased: LLE  +SLR on LLE  No clonus      LABS:                        12.9   7.4   )-----------( 244      ( 2019 04:52 )             37.9         140  |  105  |  18  ----------------------------<  85  3.5   |  26  |  0.56    Ca    9.4      2019 13:19  Phos  1.6       Mg     1.9         TPro  6.5  /  Alb  4.0  /  TBili  0.4  /  DBili  x   /  AST  10  /  ALT  12  /  AlkPhos  34<L>        Urinalysis Basic - ( 2019 14:43 )    Color: Yellow / Appearance: Slightly Turbid / S.016 / pH: x  Gluc: x / Ketone: Negative  / Bili: Negative / Urobili: Negative   Blood: x / Protein: 100 / Nitrite: Positive   Leuk Esterase: Large / RBC: 12 /hpf /  /HPF   Sq Epi: x / Non Sq Epi: 1 /hpf / Bacteria: Many        RADIOLOGY & ADDITIONAL STUDIES:    MRI showed There is a grade 1 anterolisthesis seen involving L5 on S1. Modic type I. degenerative changes are seen involving the endplates adjacent to the L5-S1 level. L1-2: Disc bulge and bilateral hypertrophic facet joint changes seen. Hypertrophic change is seen involving both ligamentum flavum. Mild to moderate narrowing of the spinal canal is seen. Severe narrowing of both neural foramen. L2-3: Disc bulge and bilateral hypertrophic facet joint changes are seen. Hypertrophic change is seen involving both ligament of flavum. Mild to moderate narrowing of the spinal canal is seen. Moderate narrowing of both neural foramen. L3-4: Disc bulge and bilateral hypertrophic facet joint changes seen. Severe narrowing of the spinal canal is seen. Mild narrowing of both neural foramen. L4-5: Disc bulge and central disc protrusion is seen. Bilateral hypertrophic facet joint changes are seen. Hypertrophic change is seen involving both ligamentum flavum. Moderate to severe narrowing of the spinal canal is seen. Mild narrowing of both neural foramen L5-S1: Disc bulge and bilateral hypertrophic facet joint changes are seen. Moderate narrowing of the spinal canal and both neural foramen. p ([id number])     HPI: 73 yr old F h/o HTN, PE, b/l knee replacement, OA, osteoporosis on ASA admitted for back pain. Pt has been driving up from [place] over the past 4 days. Yesterday, while in [place], when she went to get up from her bed, she was unable to put any weight on her L leg and fell on the ground. She fell into a sitting position, had no head trauma but was unable to get up and EMS was called. She decided against going to the ED and EMS helped her into her car where her  drove her up to [place] to come to the hospital here. She endorses that the pain starts in her lower back and shoots down her leg with any movement. She is also endorsing numbness to the L lateral side of her LLE. Denies any incontinence, saddle anesthesia.      PAST MEDICAL HISTORY   Osteoporosis  Osteoarthritis  Hypertension  No pertinent past medical history    PAST SURGICAL HISTORY   H/O total knee replacement  H/O total knee replacement, right    Aspir 81 (Hives; Other)  penicillin (Unknown)      MEDICATIONS:  Antibiotics:    Neuro:  acetaminophen   Tablet .. 975 milliGRAM(s) Oral every 6 hours PRN  cyclobenzaprine 10 milliGRAM(s) Oral three times a day PRN  oxyCODONE    IR 5 milliGRAM(s) Oral every 4 hours PRN  traMADol 50 milliGRAM(s) Oral three times a day PRN    Anticoagulation:  enoxaparin Injectable 40 milliGRAM(s) SubCutaneous every 24 hours    Other:  amLODIPine   Tablet 5 milliGRAM(s) Oral daily  losartan 100 milliGRAM(s) Oral daily  pantoprazole    Tablet 40 milliGRAM(s) Oral before breakfast  simvastatin 20 milliGRAM(s) Oral at bedtime      SOCIAL HISTORY:   Occupation:   Marital Status:     FAMILY HISTORY:  FH: CAD (coronary artery disease)      PHYSICAL EXAMINATION:   T(C): 37.2 (19 @ 04:45), Max: 37.2 (19 @ 04:45)  HR: 82 (19 @ 04:45) (76 - 94)  BP: 133/85 (19 @ 04:45) (115/74 - 133/85)  RR: 18 (19 @ 04:45) (18 - 20)  SpO2: 95% (19 @ 04:45) (95% - 99%)  Wt(kg): --    General Examination:     Neurologic Examination:             Constitutional: No Acute Distress     Neurological: AOx3, Following Commands    Motor exam:          Upper extremity                         Delt     Bicep     Tricep    HG                                                 R         5/5        5/5        5/5       5/5                                               L          5/5        5/5        5/5       5/5          Lower extremity                        HF         KF        KE       DF         PF                                 R (pain limited)     4+/5     4/5       4/5       5/5        5/5                              L (pain limited)      2/5       2/5       3/5       3/5        3/5                                                 Sensation: [] intact to light touch  [X] decreased: LLE  +SLR on LLE  No clonus      LABS:                        12.9   7.4   )-----------( 244      ( 2019 04:52 )             37.9         140  |  105  |  18  ----------------------------<  85  3.5   |  26  |  0.56    Ca    9.4      2019 13:19  Phos  1.6       Mg     1.9         TPro  6.5  /  Alb  4.0  /  TBili  0.4  /  DBili  x   /  AST  10  /  ALT  12  /  AlkPhos  34<L>        Urinalysis Basic - ( 2019 14:43 )    Color: Yellow / Appearance: Slightly Turbid / S.016 / pH: x  Gluc: x / Ketone: Negative  / Bili: Negative / Urobili: Negative   Blood: x / Protein: 100 / Nitrite: Positive   Leuk Esterase: Large / RBC: 12 /hpf /  /HPF   Sq Epi: x / Non Sq Epi: 1 /hpf / Bacteria: Many        RADIOLOGY & ADDITIONAL STUDIES:    MRI showed There is a grade 1 anterolisthesis seen involving L5 on S1. Modic type I. degenerative changes are seen involving the endplates adjacent to the L5-S1 level. L1-2: Disc bulge and bilateral hypertrophic facet joint changes seen. Hypertrophic change is seen involving both ligamentum flavum. Mild to moderate narrowing of the spinal canal is seen. Severe narrowing of both neural foramen. L2-3: Disc bulge and bilateral hypertrophic facet joint changes are seen. Hypertrophic change is seen involving both ligament of flavum. Mild to moderate narrowing of the spinal canal is seen. Moderate narrowing of both neural foramen. L3-4: Disc bulge and bilateral hypertrophic facet joint changes seen. Severe narrowing of the spinal canal is seen. Mild narrowing of both neural foramen. L4-5: Disc bulge and central disc protrusion is seen. Bilateral hypertrophic facet joint changes are seen. Hypertrophic change is seen involving both ligamentum flavum. Moderate to severe narrowing of the spinal canal is seen. Mild narrowing of both neural foramen L5-S1: Disc bulge and bilateral hypertrophic facet joint changes are seen. Moderate narrowing of the spinal canal and both neural foramen.

## 2019-06-24 ENCOUNTER — TRANSCRIPTION ENCOUNTER (OUTPATIENT)
Age: 74
End: 2019-06-24

## 2019-06-24 LAB
24R-OH-CALCIDIOL SERPL-MCNC: 40.6 NG/ML — SIGNIFICANT CHANGE UP (ref 30–80)
ANION GAP SERPL CALC-SCNC: 10 MMOL/L — SIGNIFICANT CHANGE UP (ref 5–17)
BUN SERPL-MCNC: 21 MG/DL — SIGNIFICANT CHANGE UP (ref 7–23)
CALCIUM SERPL-MCNC: 10 MG/DL — SIGNIFICANT CHANGE UP (ref 8.4–10.5)
CHLORIDE SERPL-SCNC: 105 MMOL/L — SIGNIFICANT CHANGE UP (ref 96–108)
CO2 SERPL-SCNC: 23 MMOL/L — SIGNIFICANT CHANGE UP (ref 22–31)
CREAT SERPL-MCNC: 0.46 MG/DL — LOW (ref 0.5–1.3)
GLUCOSE SERPL-MCNC: 182 MG/DL — HIGH (ref 70–99)
MAGNESIUM SERPL-MCNC: 2.1 MG/DL — SIGNIFICANT CHANGE UP (ref 1.6–2.6)
PHOSPHATE SERPL-MCNC: 2.1 MG/DL — LOW (ref 2.5–4.5)
POTASSIUM SERPL-MCNC: 4.3 MMOL/L — SIGNIFICANT CHANGE UP (ref 3.5–5.3)
POTASSIUM SERPL-SCNC: 4.3 MMOL/L — SIGNIFICANT CHANGE UP (ref 3.5–5.3)
SODIUM SERPL-SCNC: 138 MMOL/L — SIGNIFICANT CHANGE UP (ref 135–145)

## 2019-06-24 PROCEDURE — 99233 SBSQ HOSP IP/OBS HIGH 50: CPT | Mod: GC

## 2019-06-24 RX ORDER — CIPROFLOXACIN LACTATE 400MG/40ML
VIAL (ML) INTRAVENOUS
Refills: 0 | Status: DISCONTINUED | OUTPATIENT
Start: 2019-06-24 | End: 2019-06-24

## 2019-06-24 RX ORDER — SODIUM,POTASSIUM PHOSPHATES 278-250MG
1 POWDER IN PACKET (EA) ORAL ONCE
Refills: 0 | Status: COMPLETED | OUTPATIENT
Start: 2019-06-24 | End: 2019-06-24

## 2019-06-24 RX ADMIN — ENOXAPARIN SODIUM 40 MILLIGRAM(S): 100 INJECTION SUBCUTANEOUS at 18:12

## 2019-06-24 RX ADMIN — Medication 1 PACKET(S): at 13:10

## 2019-06-24 RX ADMIN — Medication 4 MILLIGRAM(S): at 13:09

## 2019-06-24 RX ADMIN — LOSARTAN POTASSIUM 100 MILLIGRAM(S): 100 TABLET, FILM COATED ORAL at 05:37

## 2019-06-24 RX ADMIN — LIDOCAINE 1 PATCH: 4 CREAM TOPICAL at 13:10

## 2019-06-24 RX ADMIN — OXYCODONE HYDROCHLORIDE 5 MILLIGRAM(S): 5 TABLET ORAL at 21:00

## 2019-06-24 RX ADMIN — PANTOPRAZOLE SODIUM 40 MILLIGRAM(S): 20 TABLET, DELAYED RELEASE ORAL at 05:38

## 2019-06-24 RX ADMIN — SIMVASTATIN 20 MILLIGRAM(S): 20 TABLET, FILM COATED ORAL at 23:16

## 2019-06-24 RX ADMIN — Medication 4 MILLIGRAM(S): at 05:38

## 2019-06-24 RX ADMIN — OXYCODONE HYDROCHLORIDE 5 MILLIGRAM(S): 5 TABLET ORAL at 20:20

## 2019-06-24 RX ADMIN — LIDOCAINE 1 PATCH: 4 CREAM TOPICAL at 20:37

## 2019-06-24 RX ADMIN — LIDOCAINE 1 PATCH: 4 CREAM TOPICAL at 01:01

## 2019-06-24 RX ADMIN — Medication 4 MILLIGRAM(S): at 18:11

## 2019-06-24 RX ADMIN — AMLODIPINE BESYLATE 5 MILLIGRAM(S): 2.5 TABLET ORAL at 05:37

## 2019-06-24 RX ADMIN — Medication 8 MILLIGRAM(S): at 23:16

## 2019-06-24 NOTE — PHYSICAL THERAPY INITIAL EVALUATION ADULT - PERTINENT HX OF CURRENT PROBLEM, REHAB EVAL
74 yo F with PMH HTN, PE, B/L knee replacement, OA, osteoporosis pw back pain. Pt had been driving up from [place] over the past 4 days. While in [place], when she went to get up from her bed, she was unable to put any weight on her L leg and fell on the ground.

## 2019-06-24 NOTE — PROGRESS NOTE ADULT - PROBLEM SELECTOR PLAN 4
H/o osteoporosis on prolia per patient.  - outpt f/u  -Will check Vitamin D level in am. - H/o osteoporosis on prolia per patient.  - outpt f/u

## 2019-06-24 NOTE — PROGRESS NOTE ADULT - ASSESSMENT
74 yo F with history of HTN, PE, b/l knee replacement, OA, osteoporosis presenting with back pain with radiation down her leg and associated numbness. 72 yo F with history of HTN, PE, b/l knee replacement, OA, osteoporosis presenting with back pain with radiation down her leg and associated numbness, currently with improvement on Medrol.

## 2019-06-24 NOTE — PHYSICAL THERAPY INITIAL EVALUATION ADULT - IMPAIRMENTS CONTRIBUTING TO GAIT DEVIATIONS, PT EVAL
impaired balance/narrow base of support/impaired postural control/pain/decreased ROM/impaired motor control/decreased strength

## 2019-06-24 NOTE — PROGRESS NOTE ADULT - SUBJECTIVE AND OBJECTIVE BOX
*** incomplete note ***      [facility] *************************************  Angel Rojas M.D., Ph.D. | PGY-1  Department of Internal Medicine  [phone] ([facility]) | [phone] ([facility])  *************************************      Patient is a 73y old  Female who presents with a chief complaint of Back pain (2019 06:23)      SUBJECTIVE / OVERNIGHT EVENTS:  Patient was seen and examined at bedside. Reports significant improvement in BLE sensation and strength. Sensation at baseline, however LLE strength not back at baseline. Wishes to get up and ambulate. Denies fever, chills, nausea, vomiting, chest pain, or dizziness.    MEDICATIONS  (STANDING):  amLODIPine   Tablet 5 milliGRAM(s) Oral daily  enoxaparin Injectable 40 milliGRAM(s) SubCutaneous every 24 hours  lidocaine   Patch 1 Patch Transdermal daily  losartan 100 milliGRAM(s) Oral daily  methylPREDNISolone   Oral   methylPREDNISolone 4 milliGRAM(s) Oral before breakfast  methylPREDNISolone 4 milliGRAM(s) Oral after lunch  methylPREDNISolone 4 milliGRAM(s) Oral after dinner  methylPREDNISolone 8 milliGRAM(s) Oral at bedtime  pantoprazole    Tablet 40 milliGRAM(s) Oral before breakfast  potassium phosphate / sodium phosphate powder 1 Packet(s) Oral once  simvastatin 20 milliGRAM(s) Oral at bedtime    MEDICATIONS  (PRN):  acetaminophen   Tablet .. 975 milliGRAM(s) Oral every 6 hours PRN Mild Pain (1 - 3), Moderate Pain (4 - 6), Severe Pain (7 - 10)  cyclobenzaprine 10 milliGRAM(s) Oral three times a day PRN Muscle Spasm  oxyCODONE    IR 5 milliGRAM(s) Oral every 4 hours PRN Severe Pain (7 - 10)  traMADol 50 milliGRAM(s) Oral three times a day PRN Moderate Pain (4 - 6)      CAPILLARY BLOOD GLUCOSE        I&O's Summary    2019 07:01  -  2019 07:00  --------------------------------------------------------  IN: 720 mL / OUT: 900 mL / NET: -180 mL        Vital Signs Last 24 Hrs  T(C): 36.7 (2019 05:00), Max: 37.2 (2019 14:00)  T(F): 98.1 (2019 05:00), Max: 98.9 (2019 14:00)  HR: 75 (2019 05:00) (74 - 89)  BP: 135/79 (2019 05:00) (122/77 - 146/83)  BP(mean): --  RR: 18 (2019 05:00) (18 - 20)  SpO2: 95% (2019 05:00) (95% - 95%)    PHYSICAL EXAM:  GENERAL: NAD, well-developed, well-nourished  HEAD: Atraumatic, Normocephalic  EYES: EOMI, PERRLA, conjunctiva and sclera clear  NECK: Supple, No JVD  CHEST/LUNG: Clear to auscultation bilaterally; No wheezes or crackles  HEART: Normal S1/S2; Regular rate and rhythm; No murmurs, rubs, or gallops  ABDOMEN: Soft, Nontender, Nondistended; Bowel sounds present  EXTREMITIES: 2+ Peripheral Pulses; No clubbing, cyanosis, or edema  PSYCH: A&Ox3  NEUROLOGY: no focal neurologic deficit; sensation decreased LLE. RLE sensation intact. 5/5 strength RLE, 1/5 strength LLE.  SKIN: No rashes or lesions    LABS:         138  |  105  |  21  ----------------------------<  182<H>  4.3   |  23  |  0.46<L>    Ca    10.0      2019 07:11  Phos  2.1     -  Mg     2.1                 Urinalysis Basic - ( 2019 14:43 )    Color: Yellow / Appearance: Slightly Turbid / S.016 / pH: x  Gluc: x / Ketone: Negative  / Bili: Negative / Urobili: Negative   Blood: x / Protein: 100 / Nitrite: Positive   Leuk Esterase: Large / RBC: 12 /hpf /  /HPF   Sq Epi: x / Non Sq Epi: 1 /hpf / Bacteria: Many        RADIOLOGY & ADDITIONAL TESTS:    Imaging Personally Reviewed:    Consultant(s) Notes Reviewed:      Care Discussed with Consultants/Other Providers:

## 2019-06-24 NOTE — PHYSICAL THERAPY INITIAL EVALUATION ADULT - IMPAIRED TRANSFERS: SIT/STAND, REHAB EVAL
narrow base of support/pain/impaired balance/impaired motor control/decreased ROM/decreased strength/impaired postural control

## 2019-06-24 NOTE — DISCHARGE NOTE PROVIDER - PROVIDER TOKENS
FREE:[LAST:[[facility]],PHONE:[phone]],FAX:[(   [phone]    -],ADDRESS:[[address] [place] [address]]]

## 2019-06-24 NOTE — PHYSICAL THERAPY INITIAL EVALUATION ADULT - ACTIVE RANGE OF MOTION EXAMINATION, REHAB EVAL
Left LE Active ROM was WFL (within functional limits)/Right UE Active ROM was WFL (within functional limits)/Right LE Active ROM was WFL (within functional limits)/L hip AROM limited 2/2 weakness/Left UE Active ROM was WFL (within functional limits)

## 2019-06-24 NOTE — PROGRESS NOTE ADULT - PROBLEM SELECTOR PLAN 3
- C/w home meds amlodipine 5mg daily  - On losartan 100mg BID. Will give daily for now  - Monitor BP

## 2019-06-24 NOTE — PHYSICAL THERAPY INITIAL EVALUATION ADULT - ADDITIONAL COMMENTS
Pt lives in a co-op with her spouse +ramp and elevator access no steps to negotiate. Pt was ambulating independently without use of an AD prior and was independent with all ADLS PTA.

## 2019-06-24 NOTE — PROGRESS NOTE ADULT - PROBLEM SELECTOR PLAN 5
- PT with hx of PE ~ 15 years ago, states it was incidentally found on CT scan. Endorses completing a 6 month course of coumadin  - Pt HDS, satting well on RA  - Outpt F/u  - DVT PPX with LMWH

## 2019-06-24 NOTE — DISCHARGE NOTE PROVIDER - NSDCCPCAREPLAN_GEN_ALL_CORE_FT
PRINCIPAL DISCHARGE DIAGNOSIS  Diagnosis: Back pain  Assessment and Plan of Treatment: PRINCIPAL DISCHARGE DIAGNOSIS  Diagnosis: Back pain  Assessment and Plan of Treatment: You were treated for your back pin with steroids. Please continue your steroids taper as prescribed.

## 2019-06-24 NOTE — DISCHARGE NOTE PROVIDER - CARE PROVIDER_API CALL
[facility],   [address] [address]  Phone: [phone]  Fax: (   )    -  Follow Up Time:

## 2019-06-24 NOTE — PROGRESS NOTE ADULT - PROBLEM SELECTOR PLAN 1
Pt with new onset back pain in setting of muscle spasms, osteoporosis and potential slipped disc.  - C/w antispasmodics flexeril  - Tylenol, tramadol, lidocaine, and oxycodone for pain  - Lovenox for DVT ppx  - PT eval  - MR lumbar and thoracic spine shows multilevel degenerative changes from L1-S1, NSX consulted-> no surgical intervention at this time  - Will start medrol dose howie - Pt with new onset back pain in setting of muscle spasms, osteoporosis and potential slipped disc.  - C/w antispasmodics flexeril  - Tylenol, tramadol, lidocaine, and oxycodone for pain  - Lovenox for DVT ppx  - PT eval  - MR lumbar and thoracic spine shows multilevel degenerative changes from L1-S1, NSX consulted-> no surgical intervention at this time  - Improving on Medrol dosepak  - If improvement continued, will discharge patient on PO Medrol

## 2019-06-24 NOTE — PHYSICAL THERAPY INITIAL EVALUATION ADULT - PRECAUTIONS/LIMITATIONS, REHAB EVAL
Pt decided against going to the ED and EMS helped her into her car where her  drove her up to [place] to come to the hospital here. Pt now with c/o 10/10 pain to low back radiating down to LLE>RLE with assosciated numbness. MRI spine 6/22: +degenerative changes.+Grade 1 anterolisthesis seen involving L5 on S1, multilevel degenerative changes from L1-S1. +stenosis L3-S1. Neurosx consulted pt with no acute surgical interventions at this time/fall precautions/spinal precautions

## 2019-06-24 NOTE — DISCHARGE NOTE PROVIDER - HOSPITAL COURSE
HPI:    72 yo F with history of HTN, PE, b/l knee replacement, OA, osteoporosis presenting with back pain. Pt has been driving up from [place] over the past 4 days. Yesterday, while in [place], when she went to get up from her bed, she was unable to put any weight on her L leg and fell on the ground. She fell into a sitting position, had no head trauma but was unable to get up and EMS was called. She decided against going to the ED and EMS helped her into her car where her  drove her up to [place] to come to the hospital here. She was sitting in her car for 8 hours and on presentation to our ER, she began to have 10/10 pain on movement of her L leg. She endorses that the pain starts in her lower back and shoots down her leg with any movement. She is also experiencing muscle spasms in her lower back that cause pain as well. She is also endorsing numbness to the L lateral side of her LLE as well as the back of her LLE all the way down to her foot. She denies numbness to her RLE but endorses some mild pain in that side as well. Pt has a h/o osteoporosis and was told that she has a slipped disc but has never had symptoms such as this before. Pt also endorsing that the day before yesterday, she had diarrhea all day, about 10 BMs that have now since completely resolved. Denies any incontinence, saddle anesthesia.        Hospital Course:    In ED, VS - Afebrile, HR 67, /64, RR 18, SpO2 100% on RA. Given acetaminophen 1g x2, flexeril 5mg, valium 5mg, oxycodone 5mg, potassium chloride 40mEq. Patient was admitted to general medicine floor. MRI of the back showed degenerative changes. Neurosurgery was consulted, which recommended no acute surgical intervention. Medrol Dosepak was started. Patient displayed clinical improvement. PT/OT/PMR were consulted. HPI:    74 yo F with history of HTN, PE, b/l knee replacement, OA, osteoporosis presenting with back pain. Pt has been driving up from [place] over the past 4 days. Yesterday, while in [place], when she went to get up from her bed, she was unable to put any weight on her L leg and fell on the ground. She fell into a sitting position, had no head trauma but was unable to get up and EMS was called. She decided against going to the ED and EMS helped her into her car where her  drove her up to [place] to come to the hospital here. She was sitting in her car for 8 hours and on presentation to our ER, she began to have 10/10 pain on movement of her L leg. She endorses that the pain starts in her lower back and shoots down her leg with any movement. She is also experiencing muscle spasms in her lower back that cause pain as well. She is also endorsing numbness to the L lateral side of her LLE as well as the back of her LLE all the way down to her foot. She denies numbness to her RLE but endorses some mild pain in that side as well. Pt has a h/o osteoporosis and was told that she has a slipped disc but has never had symptoms such as this before. Pt also endorsing that the day before yesterday, she had diarrhea all day, about 10 BMs that have now since completely resolved. Denies any incontinence, saddle anesthesia.        Hospital Course:    In ED, VS - Afebrile, HR 67, /64, RR 18, SpO2 100% on RA. Given acetaminophen 1g x2, flexeril 5mg, valium 5mg, oxycodone 5mg, potassium chloride 40mEq. Patient was admitted to general medicine floor. MRI of the back showed degenerative changes. Neurosurgery was consulted, which recommended no acute surgical intervention. Medrol Dosepak was started. Patient displayed clinical improvement. PT/OT/PMR were consulted. Recommendations were subcute rehab.

## 2019-06-25 ENCOUNTER — TRANSCRIPTION ENCOUNTER (OUTPATIENT)
Age: 74
End: 2019-06-25

## 2019-06-25 VITALS
TEMPERATURE: 98 F | DIASTOLIC BLOOD PRESSURE: 76 MMHG | HEART RATE: 93 BPM | SYSTOLIC BLOOD PRESSURE: 110 MMHG | RESPIRATION RATE: 18 BRPM | OXYGEN SATURATION: 97 %

## 2019-06-25 LAB
ALBUMIN SERPL ELPH-MCNC: 4 G/DL — SIGNIFICANT CHANGE UP (ref 3.3–5)
ALP SERPL-CCNC: 41 U/L — SIGNIFICANT CHANGE UP (ref 40–120)
ALT FLD-CCNC: 15 U/L — SIGNIFICANT CHANGE UP (ref 10–45)
ANION GAP SERPL CALC-SCNC: 14 MMOL/L — SIGNIFICANT CHANGE UP (ref 5–17)
AST SERPL-CCNC: 14 U/L — SIGNIFICANT CHANGE UP (ref 10–40)
BASOPHILS # BLD AUTO: 0 K/UL — SIGNIFICANT CHANGE UP (ref 0–0.2)
BASOPHILS NFR BLD AUTO: 0.1 % — SIGNIFICANT CHANGE UP (ref 0–2)
BILIRUB SERPL-MCNC: 0.4 MG/DL — SIGNIFICANT CHANGE UP (ref 0.2–1.2)
BUN SERPL-MCNC: 30 MG/DL — HIGH (ref 7–23)
CALCIUM SERPL-MCNC: 10.4 MG/DL — SIGNIFICANT CHANGE UP (ref 8.4–10.5)
CHLORIDE SERPL-SCNC: 103 MMOL/L — SIGNIFICANT CHANGE UP (ref 96–108)
CO2 SERPL-SCNC: 22 MMOL/L — SIGNIFICANT CHANGE UP (ref 22–31)
CREAT SERPL-MCNC: 0.59 MG/DL — SIGNIFICANT CHANGE UP (ref 0.5–1.3)
EOSINOPHIL # BLD AUTO: 0.4 K/UL — SIGNIFICANT CHANGE UP (ref 0–0.5)
EOSINOPHIL NFR BLD AUTO: 4 % — SIGNIFICANT CHANGE UP (ref 0–6)
GLUCOSE SERPL-MCNC: 115 MG/DL — HIGH (ref 70–99)
HCT VFR BLD CALC: 47.1 % — HIGH (ref 34.5–45)
HGB BLD-MCNC: 15.3 G/DL — SIGNIFICANT CHANGE UP (ref 11.5–15.5)
LYMPHOCYTES # BLD AUTO: 1.3 K/UL — SIGNIFICANT CHANGE UP (ref 1–3.3)
LYMPHOCYTES # BLD AUTO: 14 % — SIGNIFICANT CHANGE UP (ref 13–44)
MAGNESIUM SERPL-MCNC: 2.1 MG/DL — SIGNIFICANT CHANGE UP (ref 1.6–2.6)
MCHC RBC-ENTMCNC: 30.3 PG — SIGNIFICANT CHANGE UP (ref 27–34)
MCHC RBC-ENTMCNC: 32.4 GM/DL — SIGNIFICANT CHANGE UP (ref 32–36)
MCV RBC AUTO: 93.4 FL — SIGNIFICANT CHANGE UP (ref 80–100)
MONOCYTES # BLD AUTO: 0.8 K/UL — SIGNIFICANT CHANGE UP (ref 0–0.9)
MONOCYTES NFR BLD AUTO: 8.6 % — SIGNIFICANT CHANGE UP (ref 2–14)
NEUTROPHILS # BLD AUTO: 6.9 K/UL — SIGNIFICANT CHANGE UP (ref 1.8–7.4)
NEUTROPHILS NFR BLD AUTO: 73.2 % — SIGNIFICANT CHANGE UP (ref 43–77)
PHOSPHATE SERPL-MCNC: 1.6 MG/DL — LOW (ref 2.5–4.5)
PLATELET # BLD AUTO: 267 K/UL — SIGNIFICANT CHANGE UP (ref 150–400)
POTASSIUM SERPL-MCNC: 4.9 MMOL/L — SIGNIFICANT CHANGE UP (ref 3.5–5.3)
POTASSIUM SERPL-SCNC: 4.9 MMOL/L — SIGNIFICANT CHANGE UP (ref 3.5–5.3)
PROT SERPL-MCNC: 7.2 G/DL — SIGNIFICANT CHANGE UP (ref 6–8.3)
RBC # BLD: 5.05 M/UL — SIGNIFICANT CHANGE UP (ref 3.8–5.2)
RBC # FLD: 12.3 % — SIGNIFICANT CHANGE UP (ref 10.3–14.5)
SODIUM SERPL-SCNC: 139 MMOL/L — SIGNIFICANT CHANGE UP (ref 135–145)
WBC # BLD: 9.4 K/UL — SIGNIFICANT CHANGE UP (ref 3.8–10.5)
WBC # FLD AUTO: 9.4 K/UL — SIGNIFICANT CHANGE UP (ref 3.8–10.5)

## 2019-06-25 PROCEDURE — 80048 BASIC METABOLIC PNL TOTAL CA: CPT

## 2019-06-25 PROCEDURE — 93005 ELECTROCARDIOGRAM TRACING: CPT

## 2019-06-25 PROCEDURE — A9585: CPT

## 2019-06-25 PROCEDURE — 87086 URINE CULTURE/COLONY COUNT: CPT

## 2019-06-25 PROCEDURE — 72157 MRI CHEST SPINE W/O & W/DYE: CPT

## 2019-06-25 PROCEDURE — 82306 VITAMIN D 25 HYDROXY: CPT

## 2019-06-25 PROCEDURE — 99239 HOSP IP/OBS DSCHRG MGMT >30: CPT

## 2019-06-25 PROCEDURE — 97166 OT EVAL MOD COMPLEX 45 MIN: CPT

## 2019-06-25 PROCEDURE — 97161 PT EVAL LOW COMPLEX 20 MIN: CPT

## 2019-06-25 PROCEDURE — 84100 ASSAY OF PHOSPHORUS: CPT

## 2019-06-25 PROCEDURE — 80053 COMPREHEN METABOLIC PANEL: CPT

## 2019-06-25 PROCEDURE — 87186 SC STD MICRODIL/AGAR DIL: CPT

## 2019-06-25 PROCEDURE — 99285 EMERGENCY DEPT VISIT HI MDM: CPT

## 2019-06-25 PROCEDURE — 81001 URINALYSIS AUTO W/SCOPE: CPT

## 2019-06-25 PROCEDURE — 72158 MRI LUMBAR SPINE W/O & W/DYE: CPT

## 2019-06-25 PROCEDURE — 83735 ASSAY OF MAGNESIUM: CPT

## 2019-06-25 PROCEDURE — 85027 COMPLETE CBC AUTOMATED: CPT

## 2019-06-25 PROCEDURE — 99222 1ST HOSP IP/OBS MODERATE 55: CPT

## 2019-06-25 RX ORDER — ERGOCALCIFEROL 1.25 MG/1
1 CAPSULE ORAL
Qty: 0 | Refills: 0 | DISCHARGE

## 2019-06-25 RX ORDER — ACETAMINOPHEN/CAFFEINE 500MG-65MG
2 TABLET ORAL
Qty: 0 | Refills: 0 | DISCHARGE

## 2019-06-25 RX ORDER — DENOSUMAB 60 MG/ML
0 INJECTION SUBCUTANEOUS
Qty: 0 | Refills: 0 | DISCHARGE

## 2019-06-25 RX ORDER — ACETAMINOPHEN 500 MG
2 TABLET ORAL
Qty: 0 | Refills: 0 | DISCHARGE

## 2019-06-25 RX ORDER — DENOSUMAB 60 MG/ML
60 INJECTION SUBCUTANEOUS
Qty: 0 | Refills: 0 | DISCHARGE

## 2019-06-25 RX ORDER — LOSARTAN POTASSIUM 100 MG/1
1 TABLET, FILM COATED ORAL
Qty: 0 | Refills: 0 | DISCHARGE

## 2019-06-25 RX ORDER — NITROFURANTOIN MACROCRYSTAL 50 MG
1 CAPSULE ORAL
Qty: 0 | Refills: 0 | DISCHARGE

## 2019-06-25 RX ADMIN — AMLODIPINE BESYLATE 5 MILLIGRAM(S): 2.5 TABLET ORAL at 07:08

## 2019-06-25 RX ADMIN — Medication 4 MILLIGRAM(S): at 07:08

## 2019-06-25 RX ADMIN — PANTOPRAZOLE SODIUM 40 MILLIGRAM(S): 20 TABLET, DELAYED RELEASE ORAL at 07:08

## 2019-06-25 RX ADMIN — Medication 4 MILLIGRAM(S): at 11:48

## 2019-06-25 RX ADMIN — LIDOCAINE 1 PATCH: 4 CREAM TOPICAL at 11:50

## 2019-06-25 RX ADMIN — LOSARTAN POTASSIUM 100 MILLIGRAM(S): 100 TABLET, FILM COATED ORAL at 07:08

## 2019-06-25 NOTE — OCCUPATIONAL THERAPY INITIAL EVALUATION ADULT - PRECAUTIONS/LIMITATIONS, REHAB EVAL
(Continued) She is also experiencing muscle spasms in her lower back that cause pain as well. She is also endorsing numbness to the L lateral side of her LLE as well as the back of her LLE all the way down to her foot. She denies numbness to her RLE but endorses some mild pain in that side as well./spinal precautions/fall precautions

## 2019-06-25 NOTE — DISCHARGE NOTE NURSING/CASE MANAGEMENT/SOCIAL WORK - NSDCDPATPORTLINK_GEN_ALL_CORE
You can access the [facility], offered by [facility], by registering with the following website: [email or web address]

## 2019-06-25 NOTE — PROGRESS NOTE ADULT - SUBJECTIVE AND OBJECTIVE BOX
JADYN TOWNSEND  73y  Female    Complaints:  Subjective:               Vital Signs Last 24 Hrs  T(C): 36.6 (25 Jun 2019 04:31), Max: 36.6 (24 Jun 2019 21:01)  T(F): 97.9 (25 Jun 2019 04:31), Max: 97.9 (24 Jun 2019 21:01)  HR: 70 (25 Jun 2019 04:31) (70 - 78)  BP: 116/74 (25 Jun 2019 04:31) (112/77 - 124/79)  BP(mean): --  RR: 18 (25 Jun 2019 04:31) (18 - 18)  SpO2: 94% (25 Jun 2019 04:31) (94% - 98%)    PHYSICAL EXAM:  GENERAL: NAD, well-groomed, well-developed  HEENT - NC/AT, pupils equal and reactive to light,  ; Moist mucous membranes, Good dentition, No lesions  NECK: Supple, No JVD  CHEST/LUNG: Clear to auscultation bilaterally; No rales, rhonchi, wheezing  HEART: Regular rate and rhythm; No murmurs, rubs, or gallops  ABDOMEN: Soft, Nontender, Nondistended; Bowel sounds present  EXTREMITIES:  2+ Peripheral Pulses, No clubbing, cyanosis, or edema  NEURO:  No Focal deficits, sensory and motor intact  SKIN: No rashes or lesions    Consultant(s) Notes Reviewed:  [x ] YES  [ ] NO  Care Discussed with Consultants/Other Providers [ x] YES  [ ] NO    LABS:          RADIOLOGY & ADDITIONAL TESTS:    Imaging Personally Reviewed:  [ ] YES  [ ] NO  MedsMEDICATIONS  (STANDING):  amLODIPine   Tablet 5 milliGRAM(s) Oral daily  enoxaparin Injectable 40 milliGRAM(s) SubCutaneous every 24 hours  lidocaine   Patch 1 Patch Transdermal daily  losartan 100 milliGRAM(s) Oral daily  methylPREDNISolone   Oral   methylPREDNISolone 4 milliGRAM(s) Oral before breakfast  methylPREDNISolone 4 milliGRAM(s) Oral after lunch  methylPREDNISolone 4 milliGRAM(s) Oral after dinner  methylPREDNISolone 4 milliGRAM(s) Oral at bedtime  pantoprazole    Tablet 40 milliGRAM(s) Oral before breakfast  simvastatin 20 milliGRAM(s) Oral at bedtime    MEDICATIONS  (PRN):  acetaminophen   Tablet .. 975 milliGRAM(s) Oral every 6 hours PRN Mild Pain (1 - 3), Moderate Pain (4 - 6), Severe Pain (7 - 10)  cyclobenzaprine 10 milliGRAM(s) Oral three times a day PRN Muscle Spasm  oxyCODONE    IR 5 milliGRAM(s) Oral every 4 hours PRN Severe Pain (7 - 10)  traMADol 50 milliGRAM(s) Oral three times a day PRN Moderate Pain (4 - 6)      HEALTH ISSUES - PROBLEM Dx:  Pulmonary embolism: Pulmonary embolism  Prophylactic measure: Prophylactic measure  Osteoporosis: Osteoporosis  Hypertension: Hypertension  Electrolyte abnormality: Electrolyte abnormality  Back pain: Back pain JADYN TOWNSEND  73y  Female  Patient is a 73y old  Female who presents with a chief complaint of Back pain & b/l knee pain a/w numbness(25 Jun 2019 09:35)      Subjective: Patient endorsed she had 1 episode of massive diarrhea, watery nonbloody. Pt endorsed her weakness has improved since admission.      Vital Signs Last 24 Hrs  T(C): 36.5 (25 Jun 2019 13:40), Max: 36.6 (24 Jun 2019 21:01)  T(F): 97.7 (25 Jun 2019 13:40), Max: 97.9 (24 Jun 2019 21:01)  HR: 93 (25 Jun 2019 13:40) (70 - 93)  BP: 110/76 (25 Jun 2019 13:40) (110/76 - 132/76)  BP(mean): --  RR: 18 (25 Jun 2019 13:40) (18 - 18)  SpO2: 97% (25 Jun 2019 13:40) (94% - 97%)    PHYSICAL EXAM:  GENERAL: NAD, well-groomed, well-developed  HEENT - NC/AT, pupils equal and reactive to light,  ; Moist mucous membranes, Good dentition, No lesions  NECK: Supple, No JVD  CHEST/LUNG: Clear to auscultation bilaterally; No rales, rhonchi, wheezing  HEART: Regular rate and rhythm; No murmurs, rubs, or gallops  ABDOMEN: Soft, Nontender, Nondistended; Bowel sounds present  EXTREMITIES:  2+ Peripheral Pulses, No clubbing, cyanosis, or edema  NEURO:  No Focal deficits, sensory and motor intact  SKIN: No rashes or lesions    Consultant(s) Notes Reviewed:  [x ] YES  [ ] NO  Care Discussed with Consultants/Other Providers [ x] YES  [ ] NO    LABS:                        15.3   9.4   )-----------( 267      ( 25 Jun 2019 07:10 )             47.1     06-25    139  |  103  |  30<H>  ----------------------------<  115<H>  4.9   |  22  |  0.59    Ca    10.4      25 Jun 2019 07:10  Phos  1.6     06-25  Mg     2.1     06-25    TPro  7.2  /  Alb  4.0  /  TBili  0.4  /  DBili  x   /  AST  14  /  ALT  15  /  AlkPhos  41  06-25        RADIOLOGY & ADDITIONAL TESTS:    Imaging Personally Reviewed:  [ ] YES  [ ] NO  MedsMEDICATIONS  (STANDING):  amLODIPine   Tablet 5 milliGRAM(s) Oral daily  enoxaparin Injectable 40 milliGRAM(s) SubCutaneous every 24 hours  lidocaine   Patch 1 Patch Transdermal daily  losartan 100 milliGRAM(s) Oral daily  methylPREDNISolone   Oral   methylPREDNISolone 4 milliGRAM(s) Oral before breakfast  methylPREDNISolone 4 milliGRAM(s) Oral after lunch  methylPREDNISolone 4 milliGRAM(s) Oral after dinner  methylPREDNISolone 4 milliGRAM(s) Oral at bedtime  pantoprazole    Tablet 40 milliGRAM(s) Oral before breakfast  simvastatin 20 milliGRAM(s) Oral at bedtime    MEDICATIONS  (PRN):  acetaminophen   Tablet .. 975 milliGRAM(s) Oral every 6 hours PRN Mild Pain (1 - 3), Moderate Pain (4 - 6), Severe Pain (7 - 10)  cyclobenzaprine 10 milliGRAM(s) Oral three times a day PRN Muscle Spasm  oxyCODONE    IR 5 milliGRAM(s) Oral every 4 hours PRN Severe Pain (7 - 10)  traMADol 50 milliGRAM(s) Oral three times a day PRN Moderate Pain (4 - 6)      HEALTH ISSUES - PROBLEM Dx:  Pulmonary embolism: Pulmonary embolism  Prophylactic measure: Prophylactic measure  Osteoporosis: Osteoporosis  Hypertension: Hypertension  Electrolyte abnormality: Electrolyte abnormality  Back pain: Back pain

## 2019-06-25 NOTE — PROGRESS NOTE ADULT - PROBLEM SELECTOR PLAN 5
- PT with hx of PE ~ 15 years ago, states it was incidentally found on CT scan. Endorses completing a 6 month course of coumadin  - Pt HDS, satting well on RA  - Outpt F/u  - DVT PPX with LMWH - PT with hx of PE ~ 15 years ago, states it was incidentally found on CT scan. Endorses completing a 6 month course of coumadin  - Pt satting well on RA  - Outpt F/u

## 2019-06-25 NOTE — PROGRESS NOTE ADULT - ASSESSMENT
74 yo F with history of HTN, PE, b/l knee replacement, OA, osteoporosis presenting with back pain with radiation down her leg and associated numbness, currently with improvement on Medrol. 74 yo F with history of HTN, PE, b/l knee replacement, OA, osteoporosis presenting with back pain with radiation down her leg and associated numbness, with improvement on Medrol.

## 2019-06-25 NOTE — PROGRESS NOTE ADULT - ATTENDING COMMENTS
73F with morbid obesity, presents with sudden onset of LBP and LE numbness/weakness. MRI negative for spinal cord compression, (+) L5-S1 spondylolisthesis. No surgical intervention indicated as per NSG. Started on medrol pack with improvement in motor/sensation. Appreciate PMNR/PT/OT recs - recommends [facility]. Pt accepted to [facility] and pt in agreement with dc planning. Pt is stable for discharge to [facility] where she would complete medrol pack and follow up with NSG. All questions and concerns were addressed    48 minutes spent on discharge process    Erin Chavarria MD  Division of Hospital Medicine  Pager: [phone]  Office: [phone]
s/p medrol with improvement in LLE strength as well as sensation  appreciate nsg recs - continue medrol, medical management  pending PT consult    Erin Chavarria MD  Division of Hospital Medicine  Pager: [phone]  Office: [phone]
-Patient seen/examined on 6/23/19. Case/plan discussed with Dr. Donis as reviewed/edited by me above and in any comments below.  -Updated patient and her  at bedside.  -VIKRAM recs appreciated.   -Symptoms most likely due to lumbosacral radiculopathy from acute exacerbation of lumbosacral degenerative changes possibly from long car ride.   -Unlikely but GBS on differential if symptoms don't improve or if they worsen or evolve given preceding diarrheal illness.

## 2019-06-25 NOTE — OCCUPATIONAL THERAPY INITIAL EVALUATION ADULT - PERTINENT HX OF CURRENT PROBLEM, REHAB EVAL
74 yo F p/w back pain. Pt has been driving up from [place] over the past 4 days. Yesterday, while in [place], when she went to get up from her bed, she was unable to put any weight on her L leg and fell on the ground. She decided against going to the ED and EMS helped her into her car where her  drove her up to [place] to come to the hospital here. she began to have 10/10 pain on movement of her L leg. She endorses that the pain starts in her lower back and shoots down her leg (See below)

## 2019-06-25 NOTE — CONSULT NOTE ADULT - SUBJECTIVE AND OBJECTIVE BOX
Patient is a 73y old  Female who presents with a chief complaint of Back pain (25 Jun 2019 07:23)    Admission HPI:  74 yo F with history of HTN, PE, b/l knee replacement, OA, osteoporosis presenting with back pain. Pt has been driving up from [place] over the past 4 days. Yesterday, while in [place], when she went to get up from her bed, she was unable to put any weight on her L leg and fell on the ground. She fell into a sitting position, had no head trauma but was unable to get up and EMS was called. She decided against going to the ED and EMS helped her into her car where her  drove her up to [place] to come to the hospital here. She was sitting in her car for 8 hours and on presentation to our ER, she began to have 10/10 pain on movement of her L leg. She endorses that the pain starts in her lower back and shoots down her leg with any movement. She is also experiencing muscle spasms in her lower back that cause pain as well. She is also endorsing numbness to the L lateral side of her LLE as well as the back of her LLE all the way down to her foot. She denies numbness to her RLE but endorses some mild pain in that side as well. Pt has a h/o osteoporosis and was told that she has a slipped disc but has never had symptoms such as this before. Pt also endorsing that the day before yesterday, she had diarrhea all day, about 10 BMs that have now since completely resolved. Denies any incontinence, saddle anesthesia.    Interval History:  Imaging noted grade 1 anterolisthesis at L5 on S1 and multilevel degenerative disease of the spine.  Evaluated by neurosurgery and no surgical intervention deemed necessary.  Treated with medrol dose pack.     REVIEW OF SYSTEMS: No chest pain, shortness of breath, nausea, vomiting or diarhea; other ROS neg     PAST MEDICAL & SURGICAL HISTORY  Osteoporosis  Osteoarthritis  Hypertension  No pertinent past medical history  H/O total knee replacement  H/O total knee replacement, right    FUNCTIONAL HISTORY:   Lives w spouse in apartment.  PTA Independent    CURRENT FUNCTIONAL STATUS:  Mod A transfers and gait    FAMILY HISTORY   FH: CAD (coronary artery disease)    RECENT LABS/IMAGING  CBC Full  -  ( 25 Jun 2019 07:10 )  WBC Count : 9.4 K/uL  RBC Count : 5.05 M/uL  Hemoglobin : 15.3 g/dL  Hematocrit : 47.1 %  Platelet Count - Automated : 267 K/uL  Mean Cell Volume : 93.4 fl  Mean Cell Hemoglobin : 30.3 pg  Mean Cell Hemoglobin Concentration : 32.4 gm/dL  Auto Neutrophil # : 6.9 K/uL  Auto Lymphocyte # : 1.3 K/uL  Auto Monocyte # : 0.8 K/uL  Auto Eosinophil # : 0.4 K/uL  Auto Basophil # : 0.0 K/uL  Auto Neutrophil % : 73.2 %  Auto Lymphocyte % : 14.0 %  Auto Monocyte % : 8.6 %  Auto Eosinophil % : 4.0 %  Auto Basophil % : 0.1 %    06-25    139  |  103  |  30<H>  ----------------------------<  115<H>  4.9   |  22  |  0.59    Ca    10.4      25 Jun 2019 07:10  Phos  1.6     06-25  Mg     2.1     06-25    TPro  7.2  /  Alb  4.0  /  TBili  0.4  /  DBili  x   /  AST  14  /  ALT  15  /  AlkPhos  41  06-25    VITALS  T(C): 36.6 (06-25-19 @ 04:31), Max: 36.6 (06-24-19 @ 21:01)  HR: 70 (06-25-19 @ 04:31) (70 - 78)  BP: 116/74 (06-25-19 @ 04:31) (112/77 - 124/79)  RR: 18 (06-25-19 @ 04:31) (18 - 18)  SpO2: 94% (06-25-19 @ 04:31) (94% - 98%)  Wt(kg): --    ALLERGIES  Aspir 81 (Hives; Other)  penicillin (Unknown)      MEDICATIONS   acetaminophen   Tablet .. 975 milliGRAM(s) Oral every 6 hours PRN  amLODIPine   Tablet 5 milliGRAM(s) Oral daily  cyclobenzaprine 10 milliGRAM(s) Oral three times a day PRN  enoxaparin Injectable 40 milliGRAM(s) SubCutaneous every 24 hours  lidocaine   Patch 1 Patch Transdermal daily  losartan 100 milliGRAM(s) Oral daily  methylPREDNISolone   Oral   methylPREDNISolone 4 milliGRAM(s) Oral before breakfast  methylPREDNISolone 4 milliGRAM(s) Oral after lunch  methylPREDNISolone 4 milliGRAM(s) Oral after dinner  methylPREDNISolone 4 milliGRAM(s) Oral at bedtime  oxyCODONE    IR 5 milliGRAM(s) Oral every 4 hours PRN  pantoprazole    Tablet 40 milliGRAM(s) Oral before breakfast  simvastatin 20 milliGRAM(s) Oral at bedtime  traMADol 50 milliGRAM(s) Oral three times a day PRN      ----------------------------------------------------------------------------------------  PHYSICAL EXAM  Constitutional - NAD, Comfortable  HEENT - NCAT, EOMI  Neck - Supple, No limited ROM  Chest - CTA bilaterally, No wheeze, No rhonchi, No crackles  Cardiovascular - RRR, S1S2, No murmurs  Abdomen - BS+, Soft, NTND  Extremities - No C/C/E, No calf tenderness   Neurologic Exam -                    Cognitive - Awake, Alert, AAO to self, place, date, year, situation     Communication - Fluent, No dysarthria, no aphasia     Cranial Nerves - CN 2-12 intact     Motor - No focal deficits      Sensory - Intact to LT     Reflexes - DTR Intact, No primitive reflexive       Psychiatric - Mood stable, Affect WNL      Impression:    74 yo with functional deficits secondary to diagnosis of degenerative disc disease of the spine.    Plan:  PT- ROM, Bed Mob, Transfers, Amb w AD   OT- ADLs, bracing  Prec- Falls, Cardiac  Pain- Medrol dose pack, flexeril, tramadol prn  DVT Prophylaxis- Lovenox  Skin- Turn q2 h  Dispo- Patient is a 73y old  Female who presents with a chief complaint of Back pain (25 Jun 2019 07:23)    Admission HPI:  74 yo F with history of HTN, PE, b/l knee replacement, OA, osteoporosis presenting with back pain. Pt has been driving up from [place] over the past 4 days. Yesterday, while in [place], when she went to get up from her bed, she was unable to put any weight on her L leg and fell on the ground. She fell into a sitting position, had no head trauma but was unable to get up and EMS was called. She decided against going to the ED and EMS helped her into her car where her  drove her up to [place] to come to the hospital here. She was sitting in her car for 8 hours and on presentation to our ER, she began to have 10/10 pain on movement of her L leg. She endorses that the pain starts in her lower back and shoots down her leg with any movement. She is also experiencing muscle spasms in her lower back that cause pain as well. She is also endorsing numbness to the L lateral side of her LLE as well as the back of her LLE all the way down to her foot. She denies numbness to her RLE but endorses some mild pain in that side as well. Pt has a h/o osteoporosis and was told that she has a slipped disc but has never had symptoms such as this before. Pt also endorsing that the day before yesterday, she had diarrhea all day, about 10 BMs that have now since completely resolved. Denies any incontinence, saddle anesthesia.    Interval History:  Imaging noted grade 1 anterolisthesis at L5 on S1 and multilevel degenerative disease of the spine.  Evaluated by neurosurgery and no surgical intervention deemed necessary.  Treated with medrol dose pack.   Pain has improved.    REVIEW OF SYSTEMS: LBP and leg spasm (improving w meds), + poor balance, No chest pain, shortness of breath, nausea, vomiting or diarhea; other ROS neg     PAST MEDICAL & SURGICAL HISTORY  Osteoporosis  Osteoarthritis  Hypertension  H/O total knee replacement  H/O total knee replacement, right    FUNCTIONAL HISTORY:   Lives w spouse in apartment.  PTA Independent    CURRENT FUNCTIONAL STATUS:  Mod A transfers and gait    FAMILY HISTORY   FH: CAD (coronary artery disease)    RECENT LABS/IMAGING  CBC Full  -  ( 25 Jun 2019 07:10 )  WBC Count : 9.4 K/uL  RBC Count : 5.05 M/uL  Hemoglobin : 15.3 g/dL  Hematocrit : 47.1 %  Platelet Count - Automated : 267 K/uL  Mean Cell Volume : 93.4 fl  Mean Cell Hemoglobin : 30.3 pg  Mean Cell Hemoglobin Concentration : 32.4 gm/dL  Auto Neutrophil # : 6.9 K/uL  Auto Lymphocyte # : 1.3 K/uL  Auto Monocyte # : 0.8 K/uL  Auto Eosinophil # : 0.4 K/uL  Auto Basophil # : 0.0 K/uL  Auto Neutrophil % : 73.2 %  Auto Lymphocyte % : 14.0 %  Auto Monocyte % : 8.6 %  Auto Eosinophil % : 4.0 %  Auto Basophil % : 0.1 %    06-25    139  |  103  |  30<H>  ----------------------------<  115<H>  4.9   |  22  |  0.59    Ca    10.4      25 Jun 2019 07:10  Phos  1.6     06-25  Mg     2.1     06-25    TPro  7.2  /  Alb  4.0  /  TBili  0.4  /  DBili  x   /  AST  14  /  ALT  15  /  AlkPhos  41  06-25    VITALS  T(C): 36.6 (06-25-19 @ 04:31), Max: 36.6 (06-24-19 @ 21:01)  HR: 70 (06-25-19 @ 04:31) (70 - 78)  BP: 116/74 (06-25-19 @ 04:31) (112/77 - 124/79)  RR: 18 (06-25-19 @ 04:31) (18 - 18)  SpO2: 94% (06-25-19 @ 04:31) (94% - 98%)  Wt(kg): --    ALLERGIES  Aspir 81 (Hives; Other)  penicillin (Unknown)      MEDICATIONS   acetaminophen   Tablet .. 975 milliGRAM(s) Oral every 6 hours PRN  amLODIPine   Tablet 5 milliGRAM(s) Oral daily  cyclobenzaprine 10 milliGRAM(s) Oral three times a day PRN  enoxaparin Injectable 40 milliGRAM(s) SubCutaneous every 24 hours  lidocaine   Patch 1 Patch Transdermal daily  losartan 100 milliGRAM(s) Oral daily  methylPREDNISolone   Oral   methylPREDNISolone 4 milliGRAM(s) Oral before breakfast  methylPREDNISolone 4 milliGRAM(s) Oral after lunch  methylPREDNISolone 4 milliGRAM(s) Oral after dinner  methylPREDNISolone 4 milliGRAM(s) Oral at bedtime  oxyCODONE    IR 5 milliGRAM(s) Oral every 4 hours PRN  pantoprazole    Tablet 40 milliGRAM(s) Oral before breakfast  simvastatin 20 milliGRAM(s) Oral at bedtime  traMADol 50 milliGRAM(s) Oral three times a day PRN      ----------------------------------------------------------------------------------------  PHYSICAL EXAM  Constitutional - NAD, Comfortable  HEENT - NCAT, EOMI  Neck - Supple, No limited ROM  Chest - CTA bilaterally, No wheeze, No rhonchi, No crackles  Cardiovascular - RRR, S1S2, No murmurs  Abdomen - BS+, Soft, NTND  Extremities - No C/C/E, No calf tenderness   Neurologic Exam -                  AAO x 3    Motor - L HF 3/5 (pain inhibition), R HF 4/5; bl KE 4+/5, bl ADF/APF 5/5; sensation intact     Psychiatric - Mood stable, Affect WNL      Impression:    74 yo with functional deficits secondary to diagnosis of degenerative disc disease of the spine.    Plan:  PT- ROM, Bed Mob, Transfers, Amb w AD   OT- ADLs, bracing  Prec- Falls, Cardiac  Pain- Medrol dose pack, flexeril, tramadol prn  DVT Prophylaxis- Lovenox  Skin- Turn q2 h  Dispo- [facility]

## 2019-06-25 NOTE — OCCUPATIONAL THERAPY INITIAL EVALUATION ADULT - ADDITIONAL COMMENTS
MRI spine 6/22: +degenerative changes.+Grade 1 anterolisthesis seen involving L5 on S1, multilevel degenerative changes from L1-S1. +stenosis L3-S1. Neurosx consulted pt with no acute surgical interventions at this time

## 2019-06-25 NOTE — PROGRESS NOTE ADULT - PROBLEM SELECTOR PLAN 1
- Pt with new onset back pain in setting of muscle spasms, osteoporosis and potential slipped disc.  - C/w antispasmodics flexeril  - Tylenol, tramadol, lidocaine, and oxycodone for pain  - Lovenox for DVT ppx  - PT eval  - MR lumbar and thoracic spine shows multilevel degenerative changes from L1-S1, NSX consulted-> no surgical intervention at this time  - Improving on Medrol dosepak  - If improvement continued, will discharge patient on PO Medrol - Pt with new onset back pain in setting of muscle spasms, osteoporosis and potential slipped disc.  - C/w antispasmodics flexeril  - Tylenol, tramadol, lidocaine, and oxycodone for pain  - PT eval  - MR lumbar and thoracic spine shows multilevel degenerative changes from L1-S1, NSX consulted-> no surgical intervention at this time  - Improving on Medrol dosepak  - will discharge patient on PO Medrol

## 2019-06-25 NOTE — PROGRESS NOTE ADULT - PROBLEM SELECTOR PLAN 3
- C/w home meds amlodipine 5mg daily  - On losartan 100mg BID. Will give daily for now  - Monitor BP - C/w home meds amlodipine 5mg daily  - On losartan 100mg BID.   - Monitor BP

## 2019-06-26 PROBLEM — M81.0 AGE-RELATED OSTEOPOROSIS WITHOUT CURRENT PATHOLOGICAL FRACTURE: Chronic | Status: ACTIVE | Noted: 2019-06-22

## 2019-06-26 PROBLEM — I10 ESSENTIAL (PRIMARY) HYPERTENSION: Chronic | Status: ACTIVE | Noted: 2019-06-22

## 2019-06-26 PROBLEM — M19.90 UNSPECIFIED OSTEOARTHRITIS, UNSPECIFIED SITE: Chronic | Status: ACTIVE | Noted: 2019-06-22

## 2019-07-16 ENCOUNTER — APPOINTMENT (OUTPATIENT)
Dept: NEUROLOGY | Facility: CLINIC | Age: 74
End: 2019-07-16

## 2019-08-02 ENCOUNTER — OUTPATIENT (OUTPATIENT)
Dept: OUTPATIENT SERVICES | Facility: HOSPITAL | Age: 74
LOS: 1 days | End: 2019-08-02
Payer: MEDICARE

## 2019-08-02 ENCOUNTER — APPOINTMENT (OUTPATIENT)
Dept: RADIOLOGY | Facility: HOSPITAL | Age: 74
End: 2019-08-02
Payer: MEDICARE

## 2019-08-02 DIAGNOSIS — Z00.00 ENCOUNTER FOR GENERAL ADULT MEDICAL EXAMINATION WITHOUT ABNORMAL FINDINGS: ICD-10-CM

## 2019-08-02 DIAGNOSIS — Z96.659 PRESENCE OF UNSPECIFIED ARTIFICIAL KNEE JOINT: Chronic | ICD-10-CM

## 2019-08-02 DIAGNOSIS — M43.16 SPONDYLOLISTHESIS, LUMBAR REGION: ICD-10-CM

## 2019-08-02 PROCEDURE — 82945 GLUCOSE OTHER FLUID: CPT

## 2019-08-02 PROCEDURE — 89051 BODY FLUID CELL COUNT: CPT

## 2019-08-02 PROCEDURE — 84157 ASSAY OF PROTEIN OTHER: CPT

## 2019-08-02 PROCEDURE — 62270 DX LMBR SPI PNXR: CPT

## 2019-08-02 PROCEDURE — 77003 FLUOROGUIDE FOR SPINE INJECT: CPT

## 2019-08-02 PROCEDURE — 77003 FLUOROGUIDE FOR SPINE INJECT: CPT | Mod: 26

## 2019-12-30 PROBLEM — B35.1 TINEA UNGUIUM: Status: ACTIVE | Noted: 2019-12-30

## 2019-12-30 PROBLEM — D22.5 MELANOCYTIC NEVI OF TRUNK: Status: ACTIVE | Noted: 2019-12-30

## 2019-12-30 PROBLEM — D22.71 MELANOCYTIC NEVI OF RIGHT LOWER LIMB, INCLUDING HIP: Status: ACTIVE | Noted: 2019-12-30

## 2019-12-30 PROBLEM — I83.93 ASYMPTOMATIC VARICOSE VEINS OF BILATERAL LOWER EXTREMITIES: Status: ACTIVE | Noted: 2019-12-30

## 2019-12-30 PROBLEM — L81.4 OTHER MELANIN HYPERPIGMENTATION: Status: ACTIVE | Noted: 2019-12-30

## 2019-12-30 PROBLEM — L72.0 EPIDERMAL CYST: Status: ACTIVE | Noted: 2019-12-30

## 2019-12-30 PROBLEM — L82.1 OTHER SEBORRHEIC KERATOSIS: Status: ACTIVE | Noted: 2019-12-30

## 2019-12-30 PROBLEM — D18.01 HEMANGIOMA OF SKIN AND SUBCUTANEOUS TISSUE: Status: ACTIVE | Noted: 2019-12-30

## 2020-09-02 NOTE — OCCUPATIONAL THERAPY INITIAL EVALUATION ADULT - LEVEL OF INDEPENDENCE: STAND/SIT, REHAB EVAL
"  Physical Therapy Treatment Note     Name: Velma Lopez  Clinic Number: [id number]    Therapy Diagnosis:   Encounter Diagnoses   Name Primary?    Decreased range of motion of right shoulder     Weakness of right arm     Acute pain of right shoulder      Physician: Mandi Larios PA    Visit Date: 9/2/2020    Physician Orders: PT Eval and Treat:  S/p right rotator cuff repair  Dr. Garza RTC repair protocol  Eval and Treat, modalities as needed  8+ visits  Start PT in 2 weeks     Medical Diagnosis from Referral: M75.101,M12.811 (ICD-10-CM) - Rotator cuff tear arthropathy of right shoulder  Right shoulder arthroscopic subacromial decompression.  Right shoulder arthroscopic rotator cuff repair.  Right shoulder labral debridement  Right shoulder chondroplasty  Left trigger thumb A1 pulley corticosteroid injection      Evaluation Date: 8/10/2020  Authorization Period Expiration: 10/30/2020  Plan of Care Expiration: 8/10/2020 to 9/25/2020  Visit # / Visits authorized: 2/12 (3 total visits)     Time In: 1115  Time Out: 1200  Total Billable Time: 40 minutes (3 TE)  Precautions: DM II    Subjective     Pt reports: that she missed her appts last week due to [place].  She presents to PT today without brace donned.  States that she is not wearing mostly due to it being uncomfortable.   She was compliant with home exercise program.  Response to previous treatment: no adverse effects.   Functional change: none voiced at this time.     Pain: 5/10  Location: right shoulder     Objective     Shoulder Right     PROM 08/14 09/02    flexion  130 136    abduction NT 80    Internal rotation  NT 40    ER scapular plane 44 56    ER at 0° abd 40 52        Velma received therapeutic exercises to develop strength, endurance, ROM, flexibility and posture for 20 minutes including:  - shoulder rolls   15x  - scap squeezes   15x5" holds  - elbow flex/ext AROM   20x  - forearm supination/pro AROM 20x  - wrist " "extension/flex AROM  20x    - sub-max isos   10x/each  - supine passive ER T-bar  Arm at side; 15x5"  - supine flexion rhyth stab  4 rounds  - table slides flexion   15x5" [place]    Velma received the following manual therapy techniques:total time 20 minutes  - PROM R shoulder flexion/scaption, ER to patient tolerance  - grade I/II shoulder mobs  - scapular mobs sidelying    Velma received cold pack for 5' minutes to R shoulder complex for pain and inflammation control.      Home Exercises Provided and Patient Education Provided   Education provided:   - continue HEP  - HEP 3x/day  - compliance with sling wear.   - reviewed surgical precautions, healing time frames, progression with PT in regards to PROM --> AROM and lifting restrictions    Written Home Exercises Provided: yes.  Exercises were reviewed and Velma was able to demonstrate them prior to the end of the session.  Velma demonstrated good  understanding of the education provided.     See EMR under Media for exercises provided 8/14/2020.    Assessment   A.  Mrs. Lopez is a 44 y/o F with multi co-morbidities now s/p R shoulder arthroscopy including supraspinatus (1 cm) full-thickness repair with concomitant procedures.  Surgical date: 07/27/2020.  Post-op week 6.  Continue to progress PROM gains at this time.  Pain well controlled.  Semi-compliance with sling wear.  Re-educated patient on surgical precautions including lifting restrictions, healing time frames, and sling/HEP compliance.  Has follow-up with Ortho this week.  Although small-to-medium tear, but does have heavy arms and DM II - expected delayed healing; progress to  AAROM cautiously.      Velma is progressing well towards her goals.   Pt prognosis is Excellent.     Pt will continue to benefit from skilled outpatient physical therapy to address the deficits listed in the problem list box on initial evaluation, provide pt/family education and to maximize pt's level of independence in " the home and community environment.   Pt's spiritual, cultural and educational needs considered and pt agreeable to plan of care and goals.     Anticipated barriers to physical therapy: co-morbidities    Goals:   Short Term Goals (4 Weeks):   1. Pt will be compliant with HEP to assist PT treatment in restoring pain free motion of the R shoulder. Progressing towards; not met  2. Pt will improve impaired shoulder MMTs 1/2 grade B to improve strength for functional tasks. Progressing towards; not met  3. Pt will improve R shoulder flexion to >/= 20 deg to improve functional mobility of UEs.  Progressing towards; not met  4. Pt will improveR shoulder abduction to >/= 20 deg to improve functional mobility of UEs.  Progressing towards; not met     Long Term Goals (8 Weeks):   1. Pt will improve FOTO score to </= 35% to demonstrate improvements in carrying, moving, and handling objects.  Progressing towards; not met  2. Pt will improve impaired shoulder MMTs 1 grade B to improve strength for household duties.  Progressing towards; not met  3. Pt will improve R shoulder flexion to >/= 160 deg to improve functional mobility of UEs.  Progressing towards; not met  4. Pt will improve R shoulder abduction to >/= 160 deg to improve functional mobility of UEs.  Progressing towards; not met  5. Pt will move R shoulder through functional ROM in all planes without pain to improve functional QOL.  Progressing towards; not met  6. Pt will perform prior level of household duties c/o pain to improve functional QOL. Progressing towards; not met       Plan     Progress PROM at patient tolerates.   Check compliance with sling/HEP.     Jean Pérez, PT        moderate assist (50% patients effort)

## 2020-10-30 NOTE — ED PROVIDER NOTE - CADM POA URETHRAL CATHETER
No Inflammation Suggestive Of Cancer Camouflage Histology Text: There was a dense lymphocytic infiltrate which prevented adequate histologic evaluation of adjacent structures.

## 2020-12-28 ENCOUNTER — APPOINTMENT (RX ONLY)
Dept: URBAN - METROPOLITAN AREA CLINIC 122 | Facility: CLINIC | Age: 75
Setting detail: DERMATOLOGY
End: 2020-12-28

## 2020-12-28 DIAGNOSIS — D18.0 HEMANGIOMA: ICD-10-CM

## 2020-12-28 DIAGNOSIS — L82.1 OTHER SEBORRHEIC KERATOSIS: ICD-10-CM

## 2020-12-28 DIAGNOSIS — Z71.89 OTHER SPECIFIED COUNSELING: ICD-10-CM

## 2020-12-28 DIAGNOSIS — B35.3 TINEA PEDIS: ICD-10-CM | Status: INADEQUATELY CONTROLLED

## 2020-12-28 DIAGNOSIS — L81.4 OTHER MELANIN HYPERPIGMENTATION: ICD-10-CM

## 2020-12-28 DIAGNOSIS — B35.1 TINEA UNGUIUM: ICD-10-CM | Status: INADEQUATELY CONTROLLED

## 2020-12-28 DIAGNOSIS — I83.9 ASYMPTOMATIC VARICOSE VEINS OF LOWER EXTREMITIES: ICD-10-CM | Status: INADEQUATELY CONTROLLED

## 2020-12-28 PROBLEM — D18.01 HEMANGIOMA OF SKIN AND SUBCUTANEOUS TISSUE: Status: ACTIVE | Noted: 2020-12-28

## 2020-12-28 PROBLEM — I83.93 ASYMPTOMATIC VARICOSE VEINS OF BILATERAL LOWER EXTREMITIES: Status: ACTIVE | Noted: 2020-12-28

## 2020-12-28 PROCEDURE — ? FULL BODY SKIN EXAM

## 2020-12-28 PROCEDURE — 99214 OFFICE O/P EST MOD 30 MIN: CPT

## 2020-12-28 PROCEDURE — ? COUNSELING

## 2020-12-28 ASSESSMENT — LOCATION ZONE DERM
LOCATION ZONE: TRUNK
LOCATION ZONE: FEET
LOCATION ZONE: ARM
LOCATION ZONE: LEG
LOCATION ZONE: TOENAIL

## 2020-12-28 ASSESSMENT — LOCATION SIMPLE DESCRIPTION DERM
LOCATION SIMPLE: RIGHT PRETIBIAL REGION
LOCATION SIMPLE: LEFT FOOT
LOCATION SIMPLE: RIGHT FOOT
LOCATION SIMPLE: LEFT POSTERIOR UPPER ARM
LOCATION SIMPLE: RIGHT GREAT TOE
LOCATION SIMPLE: LEFT PRETIBIAL REGION
LOCATION SIMPLE: LEFT GREAT TOE
LOCATION SIMPLE: RIGHT UPPER BACK
LOCATION SIMPLE: UPPER BACK

## 2020-12-28 ASSESSMENT — LOCATION DETAILED DESCRIPTION DERM
LOCATION DETAILED: RIGHT SUPERIOR UPPER BACK
LOCATION DETAILED: SUPERIOR THORACIC SPINE
LOCATION DETAILED: LEFT PROXIMAL POSTERIOR UPPER ARM
LOCATION DETAILED: LEFT GREAT TOENAIL
LOCATION DETAILED: RIGHT GREAT TOENAIL
LOCATION DETAILED: RIGHT DORSAL FOOT
LOCATION DETAILED: LEFT DORSAL FOOT
LOCATION DETAILED: RIGHT SUPERIOR MEDIAL UPPER BACK
LOCATION DETAILED: LEFT DISTAL PRETIBIAL REGION
LOCATION DETAILED: RIGHT DISTAL PRETIBIAL REGION

## 2020-12-28 NOTE — PROCEDURE: COUNSELING
Detail Level: Generalized
Detail Level: Zone
Patient Specific Counseling (Will Not Stick From Patient To Patient): Patient being followed by podiatrist.

## 2020-12-28 NOTE — HPI: SKIN LESIONS
How Severe Is Your Skin Lesion?: mild
ab pain rectal bleeding
Have Your Skin Lesions Been Treated?: not been treated
Is This A New Presentation, Or A Follow-Up?: Skin Lesions

## 2021-06-10 NOTE — PATIENT PROFILE ADULT - FALL HARM RISK CONCLUSION
-Per wife hgbA1c ~8  -Check hgbA1c  -diabetic diet  -Sliding scale  - c/w home regimen on discharge.   - patient states patient hasn't been eating much at night which resulted in low FS in AM. she had recently decreased his even insulin and addressed the hypoglycemia. -Per wife hgbA1c ~8  -Check hgbA1c  -diabetic diet  -Sliding scale  - c/w home regimen on discharge.   - patient states patient hasn't been eating much at night which resulted in low FS in AM. she had recently decreased his even insulin and addressed the hypoglycemia. Fall with Harm Risk

## 2021-12-20 ENCOUNTER — APPOINTMENT (RX ONLY)
Dept: URBAN - METROPOLITAN AREA CLINIC 122 | Facility: CLINIC | Age: 76
Setting detail: DERMATOLOGY
End: 2021-12-20

## 2021-12-20 DIAGNOSIS — L70.8 OTHER ACNE: ICD-10-CM

## 2021-12-20 DIAGNOSIS — D69.2 OTHER NONTHROMBOCYTOPENIC PURPURA: ICD-10-CM

## 2021-12-20 DIAGNOSIS — D18.0 HEMANGIOMA: ICD-10-CM

## 2021-12-20 DIAGNOSIS — Z71.89 OTHER SPECIFIED COUNSELING: ICD-10-CM

## 2021-12-20 DIAGNOSIS — L82.1 OTHER SEBORRHEIC KERATOSIS: ICD-10-CM

## 2021-12-20 DIAGNOSIS — L81.4 OTHER MELANIN HYPERPIGMENTATION: ICD-10-CM

## 2021-12-20 DIAGNOSIS — L85.3 XEROSIS CUTIS: ICD-10-CM

## 2021-12-20 PROBLEM — D18.01 HEMANGIOMA OF SKIN AND SUBCUTANEOUS TISSUE: Status: ACTIVE | Noted: 2021-12-20

## 2021-12-20 PROCEDURE — ? COUNSELING

## 2021-12-20 PROCEDURE — 99213 OFFICE O/P EST LOW 20 MIN: CPT

## 2021-12-20 PROCEDURE — ? FULL BODY SKIN EXAM

## 2021-12-20 ASSESSMENT — LOCATION ZONE DERM
LOCATION ZONE: TRUNK
LOCATION ZONE: ARM
LOCATION ZONE: LEG

## 2021-12-20 ASSESSMENT — LOCATION DETAILED DESCRIPTION DERM
LOCATION DETAILED: RIGHT PROXIMAL PRETIBIAL REGION
LOCATION DETAILED: RIGHT ANTERIOR SHOULDER
LOCATION DETAILED: RIGHT SUPERIOR MEDIAL UPPER BACK
LOCATION DETAILED: LEFT PROXIMAL PRETIBIAL REGION
LOCATION DETAILED: LEFT PROXIMAL DORSAL FOREARM
LOCATION DETAILED: LEFT ANTERIOR SHOULDER
LOCATION DETAILED: RIGHT PROXIMAL DORSAL FOREARM
LOCATION DETAILED: RIGHT SUPERIOR UPPER BACK
LOCATION DETAILED: RIGHT RIB CAGE
LOCATION DETAILED: LEFT RIB CAGE

## 2021-12-20 ASSESSMENT — LOCATION SIMPLE DESCRIPTION DERM
LOCATION SIMPLE: RIGHT UPPER BACK
LOCATION SIMPLE: RIGHT PRETIBIAL REGION
LOCATION SIMPLE: RIGHT FOREARM
LOCATION SIMPLE: ABDOMEN
LOCATION SIMPLE: RIGHT SHOULDER
LOCATION SIMPLE: LEFT SHOULDER
LOCATION SIMPLE: LEFT FOREARM
LOCATION SIMPLE: LEFT PRETIBIAL REGION

## 2021-12-20 NOTE — PROCEDURE: MIPS QUALITY
Quality 431: Preventive Care And Screening: Unhealthy Alcohol Use - Screening: Patient screened for unhealthy alcohol use using a single question and scores less than 2 times per year
Quality 226: Preventive Care And Screening: Tobacco Use: Screening And Cessation Intervention: Patient screened for tobacco use and is an ex/non-smoker
Quality 47: Advance Care Plan: Advance Care Planning discussed and documented; advance care plan or surrogate decision maker documented in the medical record.
Quality 111:Pneumonia Vaccination Status For Older Adults: Pneumococcal Vaccination Previously Received
Detail Level: Detailed

## 2022-12-13 NOTE — PATIENT PROFILE ADULT - NSPROIMPLANTSMEDDEV_GEN_A_NUR
0700 Report received from Amy, patient assisted to bathroom, moist productive cough present, O2 sats high 80's to 90's on 8L oxymask    0945 Dr. Humphrey to bedside, per MD transfer to I[facility] for increased monitoring     1020 Turner berman RN to bedside for increased monitoring     1024 Infant doppler 150's for 1 minute    1050 Patient transferred to C on tele monitor with cullen RN Turner via wheelchair and oxygen, bedside report given to RN       Artificial joint

## 2022-12-19 ENCOUNTER — APPOINTMENT (RX ONLY)
Dept: URBAN - METROPOLITAN AREA CLINIC 122 | Facility: CLINIC | Age: 77
Setting detail: DERMATOLOGY
End: 2022-12-19

## 2022-12-19 DIAGNOSIS — L85.3 XEROSIS CUTIS: ICD-10-CM

## 2022-12-19 DIAGNOSIS — D18.0 HEMANGIOMA: ICD-10-CM

## 2022-12-19 DIAGNOSIS — D69.2 OTHER NONTHROMBOCYTOPENIC PURPURA: ICD-10-CM

## 2022-12-19 DIAGNOSIS — L81.4 OTHER MELANIN HYPERPIGMENTATION: ICD-10-CM

## 2022-12-19 PROBLEM — D18.01 HEMANGIOMA OF SKIN AND SUBCUTANEOUS TISSUE: Status: ACTIVE | Noted: 2022-12-19

## 2022-12-19 PROCEDURE — 99213 OFFICE O/P EST LOW 20 MIN: CPT

## 2022-12-19 PROCEDURE — ? COUNSELING

## 2022-12-19 ASSESSMENT — LOCATION SIMPLE DESCRIPTION DERM
LOCATION SIMPLE: LEFT UPPER BACK
LOCATION SIMPLE: RIGHT PLANTAR SURFACE
LOCATION SIMPLE: RIGHT HAND
LOCATION SIMPLE: LEFT FOREARM
LOCATION SIMPLE: LEFT PLANTAR SURFACE
LOCATION SIMPLE: ABDOMEN
LOCATION SIMPLE: RIGHT SHOULDER
LOCATION SIMPLE: LEFT LOWER BACK
LOCATION SIMPLE: RIGHT FOREARM
LOCATION SIMPLE: RIGHT HAND
LOCATION SIMPLE: LEFT HAND

## 2022-12-19 ASSESSMENT — LOCATION ZONE DERM
LOCATION ZONE: TRUNK
LOCATION ZONE: FEET
LOCATION ZONE: ARM
LOCATION ZONE: HAND
LOCATION ZONE: HAND

## 2022-12-19 ASSESSMENT — LOCATION DETAILED DESCRIPTION DERM
LOCATION DETAILED: RIGHT MEDIAL PLANTAR MIDFOOT
LOCATION DETAILED: LEFT PROXIMAL DORSAL FOREARM
LOCATION DETAILED: LEFT SUPERIOR MEDIAL MIDBACK
LOCATION DETAILED: LEFT INFERIOR UPPER BACK
LOCATION DETAILED: RIGHT PROXIMAL DORSAL FOREARM
LOCATION DETAILED: RIGHT THENAR EMINENCE
LOCATION DETAILED: RIGHT ANTERIOR SHOULDER
LOCATION DETAILED: LEFT ULNAR PALM
LOCATION DETAILED: RIGHT THENAR EMINENCE
LOCATION DETAILED: PERIUMBILICAL SKIN
LOCATION DETAILED: LEFT MEDIAL PLANTAR MIDFOOT

## 2023-03-15 NOTE — OCCUPATIONAL THERAPY INITIAL EVALUATION ADULT - FINE MOTOR COORDINATION, RIGHT HAND THUMB/FINGER OPPOSITION SKILLS, OT EVAL
How Severe Are Your Spot(S)?: moderate What Type Of Note Output Would You Prefer (Optional)?: Bullet Format What Is The Reason For Today's Visit?: Full Body Skin Examination normal performance

## 2024-11-08 NOTE — PATIENT PROFILE ADULT - NSPROMEDSPUMP_GEN_A_NUR
Start wearing over the counter insoles with medial heel wedge. You can get these at [employer].     Rest, ice, elevate,  ibuprofen/Tylenol as needed.   If pain continues or worsens over the next few weeks, see your primary care provider or a podiatrist.   
none
